# Patient Record
Sex: MALE | Race: BLACK OR AFRICAN AMERICAN | NOT HISPANIC OR LATINO | Employment: OTHER | URBAN - METROPOLITAN AREA
[De-identification: names, ages, dates, MRNs, and addresses within clinical notes are randomized per-mention and may not be internally consistent; named-entity substitution may affect disease eponyms.]

---

## 2023-05-18 ENCOUNTER — HOSPITAL ENCOUNTER (INPATIENT)
Facility: HOSPITAL | Age: 88
LOS: 1 days | Discharge: HOSPICE | DRG: 280 | End: 2023-05-20
Attending: STUDENT IN AN ORGANIZED HEALTH CARE EDUCATION/TRAINING PROGRAM | Admitting: INTERNAL MEDICINE
Payer: MEDICARE

## 2023-05-18 DIAGNOSIS — N18.6 ESRD (END STAGE RENAL DISEASE): ICD-10-CM

## 2023-05-18 DIAGNOSIS — R00.0 TACHYCARDIA: ICD-10-CM

## 2023-05-18 DIAGNOSIS — R07.9 CHEST PAIN: ICD-10-CM

## 2023-05-18 DIAGNOSIS — I50.9 CONGESTIVE HEART FAILURE, UNSPECIFIED HF CHRONICITY, UNSPECIFIED HEART FAILURE TYPE: ICD-10-CM

## 2023-05-18 DIAGNOSIS — I21.4 NSTEMI (NON-ST ELEVATED MYOCARDIAL INFARCTION): ICD-10-CM

## 2023-05-18 DIAGNOSIS — I50.41 ACUTE COMBINED SYSTOLIC AND DIASTOLIC HEART FAILURE: Primary | ICD-10-CM

## 2023-05-18 DIAGNOSIS — I50.9 ACUTE CONGESTIVE HEART FAILURE, UNSPECIFIED HEART FAILURE TYPE: ICD-10-CM

## 2023-05-18 LAB
ANION GAP SERPL CALC-SCNC: 21 MMOL/L (ref 8–16)
BASOPHILS # BLD AUTO: 0.02 X10(3)/MCL
BASOPHILS NFR BLD AUTO: 0.2 %
BUN SERPL-MCNC: 37 MG/DL (ref 6–30)
CHLORIDE SERPL-SCNC: 97 MMOL/L (ref 95–110)
CREAT SERPL-MCNC: 6.6 MG/DL (ref 0.5–1.4)
EOSINOPHIL # BLD AUTO: 0 X10(3)/MCL (ref 0–0.9)
EOSINOPHIL NFR BLD AUTO: 0 %
ERYTHROCYTE [DISTWIDTH] IN BLOOD BY AUTOMATED COUNT: 21.3 % (ref 11.5–17)
GLUCOSE SERPL-MCNC: 112 MG/DL (ref 70–110)
HCT VFR BLD AUTO: 41.1 % (ref 42–52)
HCT VFR BLD CALC: 43 %PCV (ref 36–54)
HGB BLD-MCNC: 12.6 G/DL (ref 14–18)
HGB BLD-MCNC: 15 G/DL
IMM GRANULOCYTES # BLD AUTO: 0.07 X10(3)/MCL (ref 0–0.04)
IMM GRANULOCYTES NFR BLD AUTO: 0.6 %
LYMPHOCYTES # BLD AUTO: 1.53 X10(3)/MCL (ref 0.6–4.6)
LYMPHOCYTES NFR BLD AUTO: 14 %
MCH RBC QN AUTO: 29 PG (ref 27–31)
MCHC RBC AUTO-ENTMCNC: 30.7 G/DL (ref 33–36)
MCV RBC AUTO: 94.5 FL (ref 80–94)
MONOCYTES # BLD AUTO: 0.96 X10(3)/MCL (ref 0.1–1.3)
MONOCYTES NFR BLD AUTO: 8.8 %
NEUTROPHILS # BLD AUTO: 8.33 X10(3)/MCL (ref 2.1–9.2)
NEUTROPHILS NFR BLD AUTO: 76.4 %
NRBC BLD AUTO-RTO: 1.3 %
PLATELET # BLD AUTO: 254 X10(3)/MCL (ref 130–400)
PMV BLD AUTO: 11.1 FL (ref 7.4–10.4)
POC IONIZED CALCIUM: 1.05 MMOL/L (ref 1.06–1.42)
POC TCO2 (MEASURED): 23 MMOL/L (ref 23–29)
POTASSIUM BLD-SCNC: 6.6 MMOL/L (ref 3.5–5.1)
RBC # BLD AUTO: 4.35 X10(6)/MCL (ref 4.7–6.1)
SAMPLE: ABNORMAL
SODIUM BLD-SCNC: 133 MMOL/L (ref 136–145)
TROPONIN I SERPL-MCNC: 0.83 NG/ML (ref 0–0.04)
TROPONIN I SERPL-MCNC: 1.07 NG/ML (ref 0–0.04)
WBC # SPEC AUTO: 10.91 X10(3)/MCL (ref 4.5–11.5)

## 2023-05-18 PROCEDURE — 93005 ELECTROCARDIOGRAM TRACING: CPT

## 2023-05-18 PROCEDURE — 82565 ASSAY OF CREATININE: CPT

## 2023-05-18 PROCEDURE — 96375 TX/PRO/DX INJ NEW DRUG ADDON: CPT

## 2023-05-18 PROCEDURE — 99291 CRITICAL CARE FIRST HOUR: CPT

## 2023-05-18 PROCEDURE — 80047 BASIC METABLC PNL IONIZED CA: CPT

## 2023-05-18 PROCEDURE — 25000003 PHARM REV CODE 250: Performed by: STUDENT IN AN ORGANIZED HEALTH CARE EDUCATION/TRAINING PROGRAM

## 2023-05-18 PROCEDURE — 96374 THER/PROPH/DIAG INJ IV PUSH: CPT

## 2023-05-18 PROCEDURE — 85025 COMPLETE CBC W/AUTO DIFF WBC: CPT | Performed by: NURSE PRACTITIONER

## 2023-05-18 PROCEDURE — 82962 GLUCOSE BLOOD TEST: CPT

## 2023-05-18 PROCEDURE — 96372 THER/PROPH/DIAG INJ SC/IM: CPT | Performed by: STUDENT IN AN ORGANIZED HEALTH CARE EDUCATION/TRAINING PROGRAM

## 2023-05-18 PROCEDURE — 63600175 PHARM REV CODE 636 W HCPCS: Performed by: STUDENT IN AN ORGANIZED HEALTH CARE EDUCATION/TRAINING PROGRAM

## 2023-05-18 PROCEDURE — 84484 ASSAY OF TROPONIN QUANT: CPT | Performed by: NURSE PRACTITIONER

## 2023-05-18 RX ORDER — ONDANSETRON 2 MG/ML
INJECTION INTRAMUSCULAR; INTRAVENOUS
Status: DISPENSED
Start: 2023-05-18 | End: 2023-05-19

## 2023-05-18 RX ORDER — CALCIUM GLUCONATE 20 MG/ML
1 INJECTION, SOLUTION INTRAVENOUS
Status: COMPLETED | OUTPATIENT
Start: 2023-05-18 | End: 2023-05-18

## 2023-05-18 RX ORDER — NOREPINEPHRINE BITARTRATE/D5W 8 MG/250ML
0-3 PLASTIC BAG, INJECTION (ML) INTRAVENOUS CONTINUOUS
Status: DISCONTINUED | OUTPATIENT
Start: 2023-05-19 | End: 2023-05-19

## 2023-05-18 RX ORDER — ENOXAPARIN SODIUM 100 MG/ML
1 INJECTION SUBCUTANEOUS
Status: COMPLETED | OUTPATIENT
Start: 2023-05-18 | End: 2023-05-18

## 2023-05-18 RX ORDER — ONDANSETRON 2 MG/ML
4 INJECTION INTRAMUSCULAR; INTRAVENOUS
Status: COMPLETED | OUTPATIENT
Start: 2023-05-18 | End: 2023-05-18

## 2023-05-18 RX ORDER — CALCIUM GLUCONATE 20 MG/ML
1 INJECTION, SOLUTION INTRAVENOUS EVERY 10 MIN PRN
Status: DISCONTINUED | OUTPATIENT
Start: 2023-05-18 | End: 2023-05-20 | Stop reason: HOSPADM

## 2023-05-18 RX ADMIN — ONDANSETRON 4 MG: 2 INJECTION INTRAMUSCULAR; INTRAVENOUS at 10:05

## 2023-05-18 RX ADMIN — SODIUM ZIRCONIUM CYCLOSILICATE 10 G: 10 POWDER, FOR SUSPENSION ORAL at 11:05

## 2023-05-18 RX ADMIN — CALCIUM GLUCONATE 1 G: 20 INJECTION, SOLUTION INTRAVENOUS at 11:05

## 2023-05-18 RX ADMIN — DEXTROSE MONOHYDRATE 250 ML: 100 INJECTION, SOLUTION INTRAVENOUS at 11:05

## 2023-05-18 RX ADMIN — INSULIN HUMAN 5 UNITS: 100 INJECTION, SOLUTION PARENTERAL at 11:05

## 2023-05-18 RX ADMIN — ENOXAPARIN SODIUM 80 MG: 80 INJECTION SUBCUTANEOUS at 11:05

## 2023-05-18 NOTE — FIRST PROVIDER EVALUATION
Medical screening examination initiated.  I have conducted a focused provider triage encounter, findings are as follows:    Brief history of present illness:    89-year-old male who is a dialysis patient (T/TH/Sat) and missed dialysis today due to chest pain and lower extremity swelling that started yesterday. Has 2 stents in place and history of Triple bypass. Currently on 2 L 02 all night and PRN during the day.   Dr. Gregory instructed patient and family to come to the hospital today due to acute symptoms.  Started dialysis in March of 2023.  Dialysis cath to to right chest wall.    Vitals:    05/18/23 1751   BP: 104/72   Pulse: 93   Resp: 20   Temp: 97.5 °F (36.4 °C)   TempSrc: Oral   SpO2: 97%       Pertinent physical exam:  Awake and alert    Brief workup plan:  Chest pain workup    Preliminary workup initiated; this workup will be continued and followed by the physician or advanced practice provider that is assigned to the patient when roomed.

## 2023-05-18 NOTE — Clinical Note
Diagnosis: NSTEMI (non-ST elevated myocardial infarction) [206927]   Admitting Provider:: JESSICA CABRERA [[id number]]   Future Attending Provider: JESSICA CABRERA [[id number]]   Reason for IP Medical Treatment  (Clinical interventions that can only be accomplished in the IP setting? ) :: ESRD On HD, Missed DIalysis thursday, NSTEMI   I certify that Inpatient services for greater than or equal to 2 midnights are medically necessary:: Yes   Plans for Post-Acute care--if anticipated (pick the single best option):: A. No post acute care anticipated at this time

## 2023-05-19 LAB
ALBUMIN SERPL-MCNC: 3.2 G/DL (ref 3.4–4.8)
ALBUMIN SERPL-MCNC: 3.2 G/DL (ref 3.4–4.8)
ALBUMIN/GLOB SERPL: 1 RATIO (ref 1.1–2)
ALBUMIN/GLOB SERPL: 1 RATIO (ref 1.1–2)
ALP SERPL-CCNC: 193 UNIT/L (ref 40–150)
ALP SERPL-CCNC: 194 UNIT/L (ref 40–150)
ALT SERPL-CCNC: 107 UNIT/L (ref 0–55)
ALT SERPL-CCNC: 119 UNIT/L (ref 0–55)
AST SERPL-CCNC: 114 UNIT/L (ref 5–34)
AST SERPL-CCNC: 94 UNIT/L (ref 5–34)
AV INDEX (PROSTH): 0.25
AV MEAN GRADIENT: 9 MMHG
AV PEAK GRADIENT: 12 MMHG
AV VALVE AREA: 0.78 CM2
AV VELOCITY RATIO: 0.24
BASOPHILS # BLD AUTO: 0.02 X10(3)/MCL
BASOPHILS NFR BLD AUTO: 0.2 %
BILIRUBIN DIRECT+TOT PNL SERPL-MCNC: 2 MG/DL
BILIRUBIN DIRECT+TOT PNL SERPL-MCNC: 2.2 MG/DL
BNP BLD-MCNC: ABNORMAL PG/ML
BSA FOR ECHO PROCEDURE: 2.01 M2
BUN SERPL-MCNC: 25.8 MG/DL (ref 8.4–25.7)
BUN SERPL-MCNC: 26.7 MG/DL (ref 8.4–25.7)
CALCIUM SERPL-MCNC: 9.7 MG/DL (ref 8.8–10)
CALCIUM SERPL-MCNC: 9.8 MG/DL (ref 8.8–10)
CHLORIDE SERPL-SCNC: 98 MMOL/L (ref 98–107)
CHLORIDE SERPL-SCNC: 99 MMOL/L (ref 98–107)
CO2 SERPL-SCNC: 16 MMOL/L (ref 23–31)
CO2 SERPL-SCNC: 19 MMOL/L (ref 23–31)
CREAT SERPL-MCNC: 6.26 MG/DL (ref 0.73–1.18)
CREAT SERPL-MCNC: 6.32 MG/DL (ref 0.73–1.18)
CV ECHO LV RWT: 0.36 CM
DOP CALC AO PEAK VEL: 1.74 M/S
DOP CALC AO VTI: 28.5 CM
DOP CALC LVOT AREA: 3.1 CM2
DOP CALC LVOT DIAMETER: 2 CM
DOP CALC LVOT PEAK VEL: 0.41 M/S
DOP CALC LVOT STROKE VOLUME: 22.29 CM3
DOP CALC MV VTI: 22.8 CM
DOP CALCLVOT PEAK VEL VTI: 7.1 CM
E WAVE DECELERATION TIME: 121 MSEC
E/A RATIO: 2.13
E/E' RATIO: 14.73 M/S
ECHO LV POSTERIOR WALL: 1.07 CM (ref 0.6–1.1)
EJECTION FRACTION: 15 %
EOSINOPHIL # BLD AUTO: 0 X10(3)/MCL (ref 0–0.9)
EOSINOPHIL NFR BLD AUTO: 0 %
ERYTHROCYTE [DISTWIDTH] IN BLOOD BY AUTOMATED COUNT: 21 % (ref 11.5–17)
FRACTIONAL SHORTENING: 11 % (ref 28–44)
GFR SERPLBLD CREATININE-BSD FMLA CKD-EPI: 8 MLS/MIN/1.73/M2
GFR SERPLBLD CREATININE-BSD FMLA CKD-EPI: 8 MLS/MIN/1.73/M2
GLOBULIN SER-MCNC: 3.1 GM/DL (ref 2.4–3.5)
GLOBULIN SER-MCNC: 3.3 GM/DL (ref 2.4–3.5)
GLUCOSE SERPL-MCNC: 125 MG/DL (ref 82–115)
GLUCOSE SERPL-MCNC: 99 MG/DL (ref 82–115)
HCT VFR BLD AUTO: 36.9 % (ref 42–52)
HGB BLD-MCNC: 11.4 G/DL (ref 14–18)
IMM GRANULOCYTES # BLD AUTO: 0.05 X10(3)/MCL (ref 0–0.04)
IMM GRANULOCYTES NFR BLD AUTO: 0.5 %
INTERVENTRICULAR SEPTUM: 1.16 CM (ref 0.6–1.1)
LACTATE SERPL-SCNC: 2.9 MMOL/L (ref 0.5–2.2)
LACTATE SERPL-SCNC: 7.2 MMOL/L (ref 0.5–2.2)
LEFT ATRIUM SIZE: 3.5 CM
LEFT INTERNAL DIMENSION IN SYSTOLE: 5.32 CM (ref 2.1–4)
LEFT VENTRICLE DIASTOLIC VOLUME INDEX: 89.5 ML/M2
LEFT VENTRICLE DIASTOLIC VOLUME: 179 ML
LEFT VENTRICLE MASS INDEX: 142 G/M2
LEFT VENTRICLE SYSTOLIC VOLUME INDEX: 68.5 ML/M2
LEFT VENTRICLE SYSTOLIC VOLUME: 137 ML
LEFT VENTRICULAR INTERNAL DIMENSION IN DIASTOLE: 5.99 CM (ref 3.5–6)
LEFT VENTRICULAR MASS: 283.89 G
LV LATERAL E/E' RATIO: 13.5 M/S
LV SEPTAL E/E' RATIO: 16.2 M/S
LVOT MG: 0 MMHG
LVOT MV: 0.26 CM/S
LYMPHOCYTES # BLD AUTO: 1.63 X10(3)/MCL (ref 0.6–4.6)
LYMPHOCYTES NFR BLD AUTO: 14.7 %
MAGNESIUM SERPL-MCNC: 2.7 MG/DL (ref 1.6–2.6)
MAGNESIUM SERPL-MCNC: 2.8 MG/DL (ref 1.6–2.6)
MCH RBC QN AUTO: 28.6 PG (ref 27–31)
MCHC RBC AUTO-ENTMCNC: 30.9 G/DL (ref 33–36)
MCV RBC AUTO: 92.5 FL (ref 80–94)
MONOCYTES # BLD AUTO: 1.36 X10(3)/MCL (ref 0.1–1.3)
MONOCYTES NFR BLD AUTO: 12.3 %
MV MEAN GRADIENT: 2 MMHG
MV PEAK A VEL: 0.38 M/S
MV PEAK E VEL: 0.81 M/S
MV PEAK GRADIENT: 4 MMHG
MV STENOSIS PRESSURE HALF TIME: 53 MS
MV VALVE AREA BY CONTINUITY EQUATION: 0.98 CM2
MV VALVE AREA P 1/2 METHOD: 4.15 CM2
NEUTROPHILS # BLD AUTO: 8.01 X10(3)/MCL (ref 2.1–9.2)
NEUTROPHILS NFR BLD AUTO: 72.3 %
NRBC BLD AUTO-RTO: 1.7 %
PHOSPHATE SERPL-MCNC: 5.6 MG/DL (ref 2.3–4.7)
PHOSPHATE SERPL-MCNC: 5.8 MG/DL (ref 2.3–4.7)
PISA TR MAX VEL: 2.81 M/S
PLATELET # BLD AUTO: 209 X10(3)/MCL (ref 130–400)
PMV BLD AUTO: 11.1 FL (ref 7.4–10.4)
POCT GLUCOSE: 106 MG/DL (ref 70–110)
POTASSIUM SERPL-SCNC: 4.6 MMOL/L (ref 3.5–5.1)
POTASSIUM SERPL-SCNC: 4.8 MMOL/L (ref 3.5–5.1)
PROT SERPL-MCNC: 6.3 GM/DL (ref 5.8–7.6)
PROT SERPL-MCNC: 6.5 GM/DL (ref 5.8–7.6)
PV PEAK VELOCITY: 0.86 CM/S
RA PRESSURE: 8 MMHG
RA WIDTH: 4.36 CM
RBC # BLD AUTO: 3.99 X10(6)/MCL (ref 4.7–6.1)
SODIUM SERPL-SCNC: 136 MMOL/L (ref 136–145)
SODIUM SERPL-SCNC: 138 MMOL/L (ref 136–145)
TDI LATERAL: 0.06 M/S
TDI SEPTAL: 0.05 M/S
TDI: 0.06 M/S
TR MAX PG: 32 MMHG
TRICUSPID ANNULAR PLANE SYSTOLIC EXCURSION: 0.96 CM
TV REST PULMONARY ARTERY PRESSURE: 40 MMHG
WBC # SPEC AUTO: 11.07 X10(3)/MCL (ref 4.5–11.5)

## 2023-05-19 PROCEDURE — 25000003 PHARM REV CODE 250: Performed by: NURSE PRACTITIONER

## 2023-05-19 PROCEDURE — 93005 ELECTROCARDIOGRAM TRACING: CPT

## 2023-05-19 PROCEDURE — 25000242 PHARM REV CODE 250 ALT 637 W/ HCPCS: Performed by: NURSE PRACTITIONER

## 2023-05-19 PROCEDURE — 25000003 PHARM REV CODE 250: Performed by: INTERNAL MEDICINE

## 2023-05-19 PROCEDURE — 63600175 PHARM REV CODE 636 W HCPCS: Performed by: NURSE PRACTITIONER

## 2023-05-19 PROCEDURE — 83735 ASSAY OF MAGNESIUM: CPT | Performed by: NURSE PRACTITIONER

## 2023-05-19 PROCEDURE — 84100 ASSAY OF PHOSPHORUS: CPT | Performed by: NURSE PRACTITIONER

## 2023-05-19 PROCEDURE — 83605 ASSAY OF LACTIC ACID: CPT | Performed by: INTERNAL MEDICINE

## 2023-05-19 PROCEDURE — 25500020 PHARM REV CODE 255: Performed by: NURSE PRACTITIONER

## 2023-05-19 PROCEDURE — 63600175 PHARM REV CODE 636 W HCPCS: Performed by: INTERNAL MEDICINE

## 2023-05-19 PROCEDURE — 90935 HEMODIALYSIS ONE EVALUATION: CPT

## 2023-05-19 PROCEDURE — 80053 COMPREHEN METABOLIC PANEL: CPT | Performed by: NURSE PRACTITIONER

## 2023-05-19 PROCEDURE — 11000001 HC ACUTE MED/SURG PRIVATE ROOM

## 2023-05-19 PROCEDURE — 83880 ASSAY OF NATRIURETIC PEPTIDE: CPT | Performed by: NURSE PRACTITIONER

## 2023-05-19 PROCEDURE — 85025 COMPLETE CBC W/AUTO DIFF WBC: CPT | Performed by: NURSE PRACTITIONER

## 2023-05-19 PROCEDURE — 83605 ASSAY OF LACTIC ACID: CPT | Performed by: STUDENT IN AN ORGANIZED HEALTH CARE EDUCATION/TRAINING PROGRAM

## 2023-05-19 PROCEDURE — 25000003 PHARM REV CODE 250: Performed by: STUDENT IN AN ORGANIZED HEALTH CARE EDUCATION/TRAINING PROGRAM

## 2023-05-19 PROCEDURE — 87040 BLOOD CULTURE FOR BACTERIA: CPT | Performed by: STUDENT IN AN ORGANIZED HEALTH CARE EDUCATION/TRAINING PROGRAM

## 2023-05-19 RX ORDER — NITROGLYCERIN 0.4 MG/1
0.4 TABLET SUBLINGUAL EVERY 5 MIN PRN
Status: DISCONTINUED | OUTPATIENT
Start: 2023-05-19 | End: 2023-05-20 | Stop reason: HOSPADM

## 2023-05-19 RX ORDER — DOCUSATE SODIUM 100 MG/1
100 CAPSULE, LIQUID FILLED ORAL 2 TIMES DAILY
COMMUNITY

## 2023-05-19 RX ORDER — HYDRALAZINE HYDROCHLORIDE 25 MG/1
1 TABLET, FILM COATED ORAL 3 TIMES DAILY
Status: ON HOLD | COMMUNITY
Start: 2023-04-27 | End: 2023-05-20 | Stop reason: HOSPADM

## 2023-05-19 RX ORDER — TRAMADOL HYDROCHLORIDE 50 MG/1
50 TABLET ORAL EVERY 8 HOURS PRN
COMMUNITY

## 2023-05-19 RX ORDER — DIPHENHYDRAMINE HYDROCHLORIDE 50 MG/ML
50 INJECTION INTRAMUSCULAR; INTRAVENOUS EVERY 6 HOURS PRN
Status: DISCONTINUED | OUTPATIENT
Start: 2023-05-19 | End: 2023-05-20 | Stop reason: HOSPADM

## 2023-05-19 RX ORDER — FUROSEMIDE 10 MG/ML
40 INJECTION INTRAMUSCULAR; INTRAVENOUS
Status: DISCONTINUED | OUTPATIENT
Start: 2023-05-19 | End: 2023-05-20 | Stop reason: HOSPADM

## 2023-05-19 RX ORDER — AMLODIPINE BESYLATE 5 MG/1
5 TABLET ORAL 2 TIMES DAILY
COMMUNITY

## 2023-05-19 RX ORDER — ONDANSETRON 2 MG/ML
4 INJECTION INTRAMUSCULAR; INTRAVENOUS EVERY 6 HOURS PRN
Status: DISCONTINUED | OUTPATIENT
Start: 2023-05-19 | End: 2023-05-20 | Stop reason: HOSPADM

## 2023-05-19 RX ORDER — NAPROXEN SODIUM 220 MG/1
81 TABLET, FILM COATED ORAL DAILY
Status: DISCONTINUED | OUTPATIENT
Start: 2023-05-19 | End: 2023-05-20 | Stop reason: HOSPADM

## 2023-05-19 RX ORDER — PANTOPRAZOLE SODIUM 40 MG/1
1 TABLET, DELAYED RELEASE ORAL 2 TIMES DAILY
COMMUNITY
Start: 2023-03-22

## 2023-05-19 RX ORDER — MUPIROCIN 20 MG/G
OINTMENT TOPICAL 2 TIMES DAILY
Status: DISCONTINUED | OUTPATIENT
Start: 2023-05-19 | End: 2023-05-20 | Stop reason: HOSPADM

## 2023-05-19 RX ORDER — CLOPIDOGREL BISULFATE 75 MG/1
75 TABLET ORAL DAILY
COMMUNITY

## 2023-05-19 RX ORDER — PANTOPRAZOLE SODIUM 40 MG/1
40 TABLET, DELAYED RELEASE ORAL DAILY
Status: DISCONTINUED | OUTPATIENT
Start: 2023-05-19 | End: 2023-05-20 | Stop reason: HOSPADM

## 2023-05-19 RX ORDER — SODIUM BICARBONATE 1 MEQ/ML
50 SYRINGE (ML) INTRAVENOUS
Status: COMPLETED | OUTPATIENT
Start: 2023-05-19 | End: 2023-05-19

## 2023-05-19 RX ORDER — NAPROXEN SODIUM 220 MG/1
81 TABLET, FILM COATED ORAL DAILY
COMMUNITY

## 2023-05-19 RX ORDER — NAPROXEN SODIUM 220 MG/1
1 TABLET, FILM COATED ORAL DAILY
Status: ON HOLD | COMMUNITY
End: 2023-05-20 | Stop reason: HOSPADM

## 2023-05-19 RX ADMIN — DIPHENHYDRAMINE HYDROCHLORIDE 50 MG: 50 INJECTION INTRAMUSCULAR; INTRAVENOUS at 05:05

## 2023-05-19 RX ADMIN — IOPAMIDOL 100 ML: 755 INJECTION, SOLUTION INTRAVENOUS at 05:05

## 2023-05-19 RX ADMIN — ASPIRIN 81 MG 81 MG: 81 TABLET ORAL at 02:05

## 2023-05-19 RX ADMIN — ONDANSETRON 4 MG: 2 INJECTION INTRAMUSCULAR; INTRAVENOUS at 10:05

## 2023-05-19 RX ADMIN — PIPERACILLIN AND TAZOBACTAM 4.5 G: 4; .5 INJECTION, POWDER, LYOPHILIZED, FOR SOLUTION INTRAVENOUS; PARENTERAL at 10:05

## 2023-05-19 RX ADMIN — SODIUM BICARBONATE 50 MEQ: 84 INJECTION, SOLUTION INTRAVENOUS at 12:05

## 2023-05-19 RX ADMIN — MUPIROCIN: 20 OINTMENT TOPICAL at 10:05

## 2023-05-19 RX ADMIN — VANCOMYCIN HYDROCHLORIDE 1750 MG: 500 INJECTION, POWDER, LYOPHILIZED, FOR SOLUTION INTRAVENOUS at 05:05

## 2023-05-19 RX ADMIN — NITROGLYCERIN 0.4 MG: 0.4 TABLET, ORALLY DISINTEGRATING SUBLINGUAL at 04:05

## 2023-05-19 RX ADMIN — PIPERACILLIN AND TAZOBACTAM 4.5 G: 4; .5 INJECTION, POWDER, LYOPHILIZED, FOR SOLUTION INTRAVENOUS; PARENTERAL at 04:05

## 2023-05-19 RX ADMIN — FUROSEMIDE 40 MG: 10 INJECTION, SOLUTION INTRAMUSCULAR; INTRAVENOUS at 10:05

## 2023-05-19 RX ADMIN — DIPHENHYDRAMINE HYDROCHLORIDE 50 MG: 50 INJECTION INTRAMUSCULAR; INTRAVENOUS at 12:05

## 2023-05-19 NOTE — ED PROVIDER NOTES
"Encounter Date: 5/18/2023    SCRIBE #1 NOTE: I, Jessie Vi, am scribing for, and in the presence of,  Sudheer Bloom MD. I have scribed the following portions of the note - Other sections scribed: HPI, ROS, PE, EKG.     History     Chief Complaint   Patient presents with    Chest Pain     Pt to ER via POV for chest pain.  Also n/v and swelling.  Started yesterday.  Pt missed dialysis today (TTS) due to feeling unwell.  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 Pt describes pain as "sticking" and has been ongoing for 3 months. Wife at bedside stated pt missed dialysis appointment with Dr. Gregory today and recommended they bring him to the ER for evaluation. Wife stated pt's heart rate fluctuates rapidly throughout the day. She reports pt has swollen feet and denies vomiting. Notes pt is on O2 at home.      The history is provided by the patient. The history is limited by the condition of the patient. No  was used.   Chest Pain  The current episode started yesterday. Chest pain occurs constantly. The chest pain is worsening. At its most intense, the chest pain is at 5/10. The quality of the pain is described as aching. The pain does not radiate. Pertinent negatives for primary symptoms include no fever, no abdominal pain and no vomiting.   Associated symptoms include lower extremity edema. Risk factors include male gender.   His past medical history is significant for CHF. Past medical history comments: ESRD   Procedure history is positive for cardiac catheterization.   Review of patient's allergies indicates:   Allergen Reactions    Pembrolizumab Shortness Of Breath    Iodine Other (See Comments)     Other reaction(s): Not Indicated, Unknown    Sulfa (sulfonamide antibiotics)      Other reaction(s): Unknown     History reviewed. No pertinent past medical " history.  History reviewed. No pertinent surgical history.  History reviewed. No pertinent family history.     Review of Systems   Constitutional:  Negative for fever.   HENT:  Negative for sore throat.    Eyes:  Negative for visual disturbance.   Cardiovascular:  Positive for chest pain and leg swelling.   Gastrointestinal:  Negative for abdominal pain and vomiting.   Genitourinary:  Negative for dysuria.   Skin:  Negative for rash.   Psychiatric/Behavioral:  Negative for confusion.    All other systems reviewed and are negative.    Physical Exam     Initial Vitals [05/18/23 1751]   BP Pulse Resp Temp SpO2   104/72 93 20 97.5 °F (36.4 °C) 97 %      MAP       --         Physical Exam    Nursing note and vitals reviewed.  Constitutional: He appears well-developed and well-nourished. He is not diaphoretic. He does not appear ill. No distress.   HENT:   Head: Normocephalic and atraumatic.   Eyes: Conjunctivae and EOM are normal. Pupils are equal, round, and reactive to light.   Neck: Neck supple.   Normal range of motion.  Cardiovascular:  Regular rhythm, normal heart sounds and intact distal pulses.   Tachycardia present.         No murmur heard.  Pulmonary/Chest: He is in respiratory distress. He has no wheezes. He has no rales.   Vas Cath to right chest wall.  Crackles at bases   Abdominal: Abdomen is soft. Bowel sounds are normal. He exhibits no distension. There is no abdominal tenderness. There is no rebound.   Musculoskeletal:         General: Edema present. No tenderness. Normal range of motion.      Cervical back: Normal range of motion and neck supple.      Lumbar back: Normal. No tenderness. Normal range of motion.      Comments: 2+ pitting edema to lower extremities.     Neurological: He is alert and oriented to person, place, and time. He has normal strength. No cranial nerve deficit or sensory deficit.   Skin: Skin is warm and dry. Capillary refill takes less than 2 seconds. No rash noted. No erythema.    Psychiatric: He has a normal mood and affect. His mood appears not anxious.       ED Course   Critical Care    Date/Time: 5/18/2023 9:40 PM  Performed by: Sudheer Bloom MD  Authorized by: Sudheer Bloom MD   Direct patient critical care time: 10 minutes  Additional history critical care time: 7 minutes  Ordering / reviewing critical care time: 5 minutes  Documentation critical care time: 5 minutes  Consulting other physicians critical care time: 5 minutes  Consult with family critical care time: 2 minutes  Total critical care time (exclusive of procedural time) : 34 minutes  Critical care time was exclusive of separately billable procedures and treating other patients and teaching time.  Critical care was necessary to treat or prevent imminent or life-threatening deterioration of the following conditions: cardiac failure, circulatory failure, renal failure, metabolic crisis and respiratory failure.  Critical care was time spent personally by me on the following activities: development of treatment plan with patient or surrogate, discussions with consultants, interpretation of cardiac output measurements, evaluation of patient's response to treatment, examination of patient, obtaining history from patient or surrogate, ordering and performing treatments and interventions, ordering and review of laboratory studies, ordering and review of radiographic studies, pulse oximetry, re-evaluation of patient's condition and review of old charts.      Labs Reviewed   COMPREHENSIVE METABOLIC PANEL - Abnormal; Notable for the following components:       Result Value    Carbon Dioxide 16 (*)     Glucose Level 125 (*)     Blood Urea Nitrogen 25.8 (*)     Creatinine 6.26 (*)     Albumin Level 3.2 (*)     Albumin/Globulin Ratio 1.0 (*)     Bilirubin Total 2.2 (*)     Alkaline Phosphatase 193 (*)     Alanine Aminotransferase 107 (*)     Aspartate Aminotransferase 94 (*)     All other components within normal limits   TROPONIN I -  Abnormal; Notable for the following components:    Troponin-I 0.833 (*)     All other components within normal limits   TROPONIN I - Abnormal; Notable for the following components:    Troponin-I 1.069 (*)     All other components within normal limits   B-TYPE NATRIURETIC PEPTIDE - Abnormal; Notable for the following components:    Natriuretic Peptide 18,927.3 (*)     All other components within normal limits   CBC WITH DIFFERENTIAL - Abnormal; Notable for the following components:    RBC 4.35 (*)     Hgb 12.6 (*)     Hct 41.1 (*)     MCV 94.5 (*)     MCHC 30.7 (*)     RDW 21.3 (*)     MPV 11.1 (*)     IG# 0.07 (*)     All other components within normal limits   MAGNESIUM - Abnormal; Notable for the following components:    Magnesium Level 2.80 (*)     All other components within normal limits   PHOSPHORUS - Abnormal; Notable for the following components:    Phosphorus Level 5.8 (*)     All other components within normal limits   LACTIC ACID, PLASMA - Abnormal; Notable for the following components:    Lactic Acid Level 7.2 (*)     All other components within normal limits   COMPREHENSIVE METABOLIC PANEL - Abnormal; Notable for the following components:    Carbon Dioxide 19 (*)     Blood Urea Nitrogen 26.7 (*)     Creatinine 6.32 (*)     Albumin Level 3.2 (*)     Albumin/Globulin Ratio 1.0 (*)     Bilirubin Total 2.0 (*)     Alkaline Phosphatase 194 (*)     Alanine Aminotransferase 119 (*)     Aspartate Aminotransferase 114 (*)     All other components within normal limits   MAGNESIUM - Abnormal; Notable for the following components:    Magnesium Level 2.70 (*)     All other components within normal limits   PHOSPHORUS - Abnormal; Notable for the following components:    Phosphorus Level 5.6 (*)     All other components within normal limits   CBC WITH DIFFERENTIAL - Abnormal; Notable for the following components:    RBC 3.99 (*)     Hgb 11.4 (*)     Hct 36.9 (*)     MCHC 30.9 (*)     RDW 21.0 (*)     MPV 11.1 (*)      Mono # 1.36 (*)     IG# 0.05 (*)     All other components within normal limits   LACTIC ACID, PLASMA - Abnormal; Notable for the following components:    Lactic Acid Level 2.9 (*)     All other components within normal limits   ISTAT CHEM8 - Abnormal; Notable for the following components:    POC Glucose 112 (*)     POC BUN 37 (*)     POC Creatinine 6.6 (*)     POC Sodium 133 (*)     POC Potassium 6.6 (*)     POC Anion Gap 21 (*)     POC Ionized Calcium 1.05 (*)     All other components within normal limits   CBC W/ AUTO DIFFERENTIAL    Narrative:     The following orders were created for panel order CBC auto differential.  Procedure                               Abnormality         Status                     ---------                               -----------         ------                     CBC with Differential[[id number]]        Abnormal            Final result                 Please view results for these tests on the individual orders.   CBC W/ AUTO DIFFERENTIAL    Narrative:     The following orders were created for panel order CBC Auto Differential.  Procedure                               Abnormality         Status                     ---------                               -----------         ------                     CBC with Differential[[id number]]        Abnormal            Final result                 Please view results for these tests on the individual orders.   POCT GLUCOSE     EKG Readings: (Independently Interpreted)   Initial Reading: No STEMI. Rhythm: Normal Sinus Rhythm. Heart Rate: 95. Ectopy: PVCs. Conduction: Normal. ST Segments: Normal ST Segments. T Waves: Normal. Axis: Normal. Clinical Impression: Normal Sinus Rhythm with LBBB   Performed at 1753 on 05/18/2023.   ECG Results              EKG 12-lead (Final result)  Result time 05/19/23 19:23:06      Final result by Interface, Lab In [facility] (05/19/23 19:23:06)                   Narrative:    Test Reason : R07.9,    Vent. Rate : 087 BPM      Atrial Rate : 087 BPM     P-R Int : 172 ms          QRS Dur : 136 ms      QT Int : 440 ms       P-R-T Axes : 017 -10 194 degrees     QTc Int : 529 ms    Sinus rhythm with occasional Premature ventricular complexes  Left bundle branch block  Abnormal ECG  Normal sinus rhythm has replaced SVT    Confirmed by Leeroy Mccloud MD ([id number]) on 5/19/2023 7:22:58 PM    Referred By: [email or web address]   SELF           Confirmed By:Leeroy Mccloud MD                                     EKG 12-lead (Final result)  Result time 05/19/23 08:31:52      Final result by Interface, Lab In [facility] (05/19/23 08:31:52)                   Narrative:    Test Reason : R00.0,    Vent. Rate : 128 BPM     Atrial Rate : 000 BPM     P-R Int : 000 ms          QRS Dur : 144 ms      QT Int : 398 ms       P-R-T Axes : 000 -03 194 degrees     QTc Int : 581 ms    SVT  Left bundle branch block  Abnormal ECG  When compared with ECG of 18-MAY-2023 17:53,  SVT  has replaced Sinus rhythm  Confirmed by Leeroy Mccloud MD ([id number]) on 5/19/2023 8:31:46 AM    Referred By: [email or web address]   SELF           Confirmed By:Leeroy Mccloud MD                                     EKG 12-lead (Final result)  Result time 05/19/23 08:25:37      Final result by Interface, Lab In [facility] (05/19/23 08:25:37)                   Narrative:    Test Reason : R07.9,    Vent. Rate : 095 BPM     Atrial Rate : 095 BPM     P-R Int : 204 ms          QRS Dur : 122 ms      QT Int : 424 ms       P-R-T Axes : 026 012 204 degrees     QTc Int : 532 ms    Sinus rhythm with occasional Premature ventricular complexes and Fusion  complexes  Left bundle branch block  Abnormal ECG  No previous ECGs available  Confirmed by Leeroy Mccloud MD ([id number]) on 5/19/2023 8:25:28 AM    Referred By:             Confirmed By:Leeroy Mccloud MD                                     EKG 12-LEAD (Final result)  Result time 05/23/23 15:39:25      Final result by Unknown User (05/23/23 15:39:25)                                       Imaging Results              CT Chest Abdomen Pelvis With Contrast (xpd) (Final result)  Result time 05/19/23 08:54:28      Final result by Maged Amaya MD (05/19/23 08:54:28)                   Impression:    Impression:    1. No filling defects are seen in the pulmonary arteries to suggest pulmonary embolus to the sensitivity of the study.    2. Moderate free fluid is seen in the pelvis.    3. The bladder is nondistended however the bladder wall appears thickened after considering state of nondistension which could reflect an element of cystitis.    4. There are moderate bilateral pleural effusions associated with lower lobe collapse and dependent groundglass densities in posterior segments of upper lobes. Otherwise the lungs are clear with no focal infiltrates or consolidation.    5. There is mild edematous thickening of the cecum and the ascending colon. Correlate with clinical and laboratory findings with regards to colitis.    6. Details and other findings as discussed above.    No significant discrepancy with overnight report.      Electronically signed by: Maged Amaya  Date:    05/19/2023  Time:    08:54               Narrative:      TECHNIQUE:CT SCAN OF THE CHEST ABDOMEN AND PELVIS WAS PERFORMED WITH INTRAVENOUS CONTRAST WITH AXIAL AS WELL AS SAGITTAL AND, CORONAL IMAGES.    DOSAGE INFORMATION:AUTOMATED EXPOSURE CONTROL WAS UTILIZED.  DLP 1320    COMPARISON:NONE.    CLINICAL HISTORY:SEPSIS.    Findings:    Lines and Tubes:Right internal jugular, catheter is present with its tip in the superior vena cave.    Mediastinum:A few subcentimeter mediastinal lymph nodes are seen paratracheal and precarinal and subcarinal.    Heart: Coronary artery calcification is seen. The patient is status post median sternotomy.    Aorta:Unremarkable appearing aorta. Mild aortic calcification is seen in the arch thoracic aorta.    Pulmonary Arteries:No filling defects are seen in the pulmonary arteries to suggest  pulmonary embolus to the sensitivity of the study.    Lungs:There are moderate bilateral pleural effusions associated with lower lobe collapse and dependent groundglass densities in posterior segments of upper lobes. Otherwise the lungs are clear with no focal infiltrates or consolidation.    Pleura:There is a large right sided pleural effusion. There is a large left sided pleural effusion.    Bony Structures:    Spine:Moderate spondylolytic changes are seen in the thoracic spine.    Ribs:No rib fractures are identified.    Abdomen:    Abdominal Wall:There is extensive stranding of the subcutaneous fat suggesting an element of anasarca edema of uncertain etiology. The abdominal wall otherwise appears unremarkable.    Liver: There is right hepatic lobe 9 mm hypodensity on image 56 series 2 which is not adequately characterized and may be a cyst.  This can be further assessed with ultrasound exam.    Biliary System:No intrahepatic or extrahepatic biliary duct dilatation is seen.    Gallbladder:The gallbladder is non-distended and appears otherwise unremarkable.    Pancreas:The pancreas appears unremarkable.    Spleen:The spleen appears unremarkable.    Adrenals:The adrenal glands appear unremarkable.    Kidneys:Multiple cortical hypodensities in bilateral kidneys are noted.  These are not adequately assessed due to limited contrast opacification of the kidneys and may be cysts.  Please further assess with ultrasound exam.  The kidneys are without stones or hydronephrosis.    Aorta:The abdominal aorta appears unremarkable.    Bowel:    Esophagus:The visualized esophagus appears unremarkable.    Stomach:The stomach appears unremarkable.    Duodenum:Unremarkable appearing duodenum.    Small Bowel:The small bowel appears unremarkable.    Colon:Nondistended. There is mild edematous thickening of the cecum and the ascending colon.    Appendix:The appendix is not identified but no inflammatory changes are seen in the right  lower quadrant to suggest appendicitis.    Peritoneum:No free intraperitoneal air is seen. Moderate free fluid is seen in the pelvis.    Pelvis:    Bladder:The bladder is nondistended however the bladder wall appears thickened after considering state of nondistension which could reflect an element of cystitis. There is air in the lumen of urinary bladder. Correlate clinically with regards to recent catheterisation.    Male:    Prostate gland:There are multiple punctate hyperdensities in the prostate gland. These are consistent with brachytherapy beads.    Bony structures:    Dorsal Spine:There is moderate multilevel spondylosis of the visualized dorsal spine.    Bony Pelvis:The visualized bony structures of the pelvis appear unremarkable.                        Preliminary result by Maged Amaya MD (05/19/23 05:47:07)                   Narrative:    START OF REPORT:  TECHNIQUE: CT SCAN OF THE CHEST ABDOMEN AND PELVIS WAS PERFORMED WITH INTRAVENOUS CONTRAST WITH AXIAL AS WELL AS SAGITTAL AND, CORONAL IMAGES.    DOSAGE INFORMATION: AUTOMATED EXPOSURE CONTROL WAS UTILIZED.    COMPARISON: NONE.    CLINICAL HISTORY: SEPSIS.    Findings:  Soft Tissues: Unremarkable.  Lines and Tubes: Right internal jugular, catheter is present with its tip in the superior vena cave.  Neck: The visualized soft tissues of the neck appear unremarkable. The thyroid gland appear unremarkable.  Mediastinum: A few subcentimeter mediastinal lymph nodes are seen paratracheal and precarinal and subcarinal . This suggests reactive lymphadenopathy.  Heart: Mild cardiomegaly is seen. Coronary artery calcification is seen. The patient is status post median sternotomy.  Aorta: Unremarkable appearing aorta. Mild aortic calcification is seen in the arch thoracic aorta.  Pulmonary Arteries: No filling defects are seen in the pulmonary arteries to suggest pulmonary embolus to the sensitivity of the study.  Lungs: There are moderate bilateral pleural  effusions associated with lower lobe collapse and dependent groundglass densities in posterior segments of upper lobes. Otherwise the lungs are clear with no focal infiltrates or consolidation.  Pleura: There is a large right sided pleural effusion. There is a large left sided pleural effusion.  Bony Structures:  Spine: Moderate spondylolytic changes are seen in the thoracic spine.  Ribs: No rib fractures are identified.  Abdomen:  Abdominal Wall: There is extensive stranding of the subcutaneous fat suggesting an element of anasarca edema of uncertain etiology. The abdominal wall otherwise appears unremarkable.  Liver: The liver otherwise appears unremarkable.  Biliary System: No intrahepatic or extrahepatic biliary duct dilatation is seen.  Gallbladder: The gallbladder is non-distended and appears otherwise unremarkable.  Pancreas: The pancreas appears unremarkable.  Spleen: The spleen appears unremarkable.  Adrenals: The adrenal glands appear unremarkable.  Kidneys: Multiple simple cortical cysts in bilateral kidneys are noted. The kidneys otherwise appear unremarkable with no stones or masses or hydronephrosis.  Aorta: The abdominal aorta appears unremarkable.  IVC: Unremarkable.  Bowel:  Esophagus: The visualized esophagus appears unremarkable.  Stomach: The stomach appears unremarkable.  Duodenum: Unremarkable appearing duodenum.  Small Bowel: The small bowel appears unremarkable.  Colon: Nondistended. There is mild edematous thickening of the cecum and the ascending colon.  Appendix: The appendix is not identified but no inflammatory changes are seen in the right lower quadrant to suggest appendicitis.  Peritoneum: No free intraperitoneal air is seen. Moderate free fluid is seen in the pelvis.    Pelvis:  Bladder: The bladder is nondistended however the bladder wall appears thickened after considering state of nondistension which could reflect an element of cystitis. There is air in the lumen of urinary  bladder. Correlate clinically with regards to recent catheterisation.  Male:  Prostate gland: There are multiple punctate hyperdensities in the prostate gland. These are consistent with brachytherapy beads.    Bony structures:  Dorsal Spine: There is moderate multilevel spondylosis of the visualized dorsal spine.  Bony Pelvis: The visualized bony structures of the pelvis appear unremarkable.      Impression:  1. No filling defects are seen in the pulmonary arteries to suggest pulmonary embolus to the sensitivity of the study.  2. Moderate free fluid is seen in the pelvis.  3. The bladder is nondistended however the bladder wall appears thickened after considering state of nondistension which could reflect an element of cystitis.  4. There are moderate bilateral pleural effusions associated with lower lobe collapse and dependent groundglass densities in posterior segments of upper lobes. Otherwise the lungs are clear with no focal infiltrates or consolidation.  5. There is mild edematous thickening of the cecum and the ascending colon. Correlate with clinical and laboratory findings with regards to colitis.  6. Details and other findings as discussed above.                                         X-Ray Chest PA And Lateral (Final result)  Result time 05/18/23 19:43:39      Final result by Olena Campos MD (05/18/23 19:43:39)                   Impression:      Enlarged cardiac silhouette with mild vascular congestion and probable trace effusions.      Electronically signed by: Olena Campos  Date:    05/18/2023  Time:    19:43               Narrative:    EXAMINATION:  XR CHEST PA AND LATERAL    CLINICAL HISTORY:  Chest Pain;    TECHNIQUE:  Two views of the chest    COMPARISON:  04/22/2012    FINDINGS:  LINES AND TUBES: Right IJ CVC tip projects over the right atrium.    MEDIASTINUM AND JESSICA: Cardiac silhouette is enlarged. Aortic atherosclerosis.    LUNGS: Mild vascular congestion.    PLEURA:Probable trace  pleural effusions.  Lateral view limited due to imaging of patient in a chair which obscures the posterior costophrenic sulci.No pneumothorax.    BONES: Postop sternotomy.                                    X-Rays:   Independently Interpreted Readings:   Chest X-Ray: Increased vascular markings consistent with CHF are present.   Medications   dextrose 10% bolus 500 mL 500 mL (0 mLs Intravenous Stopped 5/18/23 2342)     And   dextrose 10% bolus 250 mL 250 mL (has no administration in time range)     And   insulin regular injection 5 Units 0.05 mL (5 Units Intravenous Given 5/18/23 2313)   ondansetron injection 4 mg (4 mg Intravenous Given 5/18/23 2217)   enoxaparin injection 80 mg (80 mg Subcutaneous Given 5/18/23 2316)   sodium zirconium cyclosilicate packet 10 g (10 g Oral Given 5/18/23 2317)   calcium gluconate 1 g in NS IVPB (premixed) (0 g Intravenous Stopped 5/18/23 2326)   sodium bicarbonate 8.4 % (1 mEq/mL) injection 50 mEq (50 mEq Intravenous Given 5/19/23 0050)   vancomycin (VANCOCIN) 1,750 mg in dextrose 5 % (D5W) 500 mL IVPB (0 mg Intravenous Stopped 5/19/23 0741)   iopamidoL (ISOVUE-370) injection 100 mL (100 mLs Intravenous Given 5/19/23 0553)     Medical Decision Making  Problems Addressed:  Acute congestive heart failure, unspecified heart failure type: acute illness or injury that poses a threat to life or bodily functions  Chest pain: acute illness or injury that poses a threat to life or bodily functions  Congestive heart failure, unspecified HF chronicity, unspecified heart failure type: acute illness or injury that poses a threat to life or bodily functions  ESRD (end stage renal disease): acute illness or injury that poses a threat to life or bodily functions  NSTEMI (non-ST elevated myocardial infarction): acute illness or injury that poses a threat to life or bodily functions    Amount and/or Complexity of Data Reviewed  Independent Historian: spouse     Details: Pt missed dialysis appointment  with Dr. Gregory today and recommended they bring him to the ER for evaluation. Stated pt's heart rate fluctuates rapidly throughout the day. Reports pt has swollen feet and denies vomiting. Notes pt is on O2 at home.  Labs: ordered.  Radiology: ordered and independent interpretation performed.  ECG/medicine tests: ordered and independent interpretation performed.    Risk  Parenteral controlled substances.  Decision regarding hospitalization.      Medical Decision Making:   History:   I obtained history from: someone other than patient and EMS provider.       <> Summary of History: Collateral history obtained from family.  History of dialysis ESRD on Tuesday Thursday Saturday.  Old Medical Records: I decided to obtain old medical records.  Old Records Summarized: records from clinic visits and records from previous admission(s).       <> Summary of Records: Review previous records, history of ESRD, congestive heart failure, has had previous heart catheterization.  Initial Assessment:   Shortness of breath likely volume overload from missing dialysis.  Differential Diagnosis:   Judging by the patient's chief complaint and pertinent history, the patient has the following possible differential diagnoses, including but not limited to the following.  Some of these are deemed to be lower likelihood and some more likely based on my physical exam and history combined with possible lab work and/or imaging studies.   Please see the pertinent studies, and refer to the HPI.  Some of these diagnoses will take further evaluation to fully rule out, perhaps as an outpatient and the patient was encouraged to follow up when discharged for more comprehensive evaluation.    ACS, pneumonia, COVID/Flu, congestive heart failure, asthma, COPD, pleural effusion, pulmonary edema, acute bronchitis, pneumothorax, hemothorax, aortic dissection, electrolyte abnormalities, anemia, anxiety     Independently Interpreted Test(s):   I have ordered and  independently interpreted EKG Reading(s) - see prior notes  Clinical Tests:   Lab Tests: Ordered and Reviewed  Radiological Study: Ordered and Reviewed  Medical Tests: Reviewed and Ordered  ED Management:  Patient is a 89-year-old male presents to the emergency department for chest pain, shortness of breath, ESRD missed dialysis earlier today.  See HPI.  See physical exam.  Lab work notable for markedly elevated BNP, patient with pulmonary edema on imaging.  Given initial presentation of hypoxia, infiltrates, given 1 time dose of antibiotic but low threshold deescalate low suspicion for any infectious process.  Blood cultures have been obtained.  Discussed with Nephrology for dialysis.  Discussed with cardiology due to elevated troponin.  Discussed with medicine for admission.  All results discussed with the patient and family.  Answered all questions at this time.  Patient family verbalized understanding agreed to plan.  Hemodynamically stable at this time.  Will be taken for emergent dialysis.  Other:   I have discussed this case with another health care provider.        Scribe Attestation:   Scribe #1: I performed the above scribed service and the documentation accurately describes the services I performed. I attest to the accuracy of the note.    Attending Attestation:           Physician Attestation for Scribe:  Physician Attestation Statement for Scribe #1: I, Sudheer Bloom MD, reviewed documentation, as scribed by Jessie Rivers in my presence, and it is both accurate and complete.           ED Course as of 06/05/23 1752   Thu May 18, 2023   2203 Paged Dr. Tinoco.  [MM]   2203 Paged Dr. Gregory. [MM]   2207 X-Ray Chest PA And Lateral [RP]   2208 Spoke with Jonathan Yoon, on call for Dr. Skinny Tinoco.   [RP]   2208 Per chart review patient had recent heart catheterization Akiko.  Unable to visualize results. [RP]   2216 Beronica with renal.  [RP]   2221 Paged Hospital Medicine. [MM]      ED Course User Index  [MM]  Barb Mcmullen  [RP] Sudheer Bloom MD                 Clinical Impression:   Final diagnoses:  [R07.9] Chest pain  [I21.4] NSTEMI (non-ST elevated myocardial infarction)  [N18.6] ESRD (end stage renal disease)  [I50.9] Congestive heart failure, unspecified HF chronicity, unspecified heart failure type  [I50.9] Acute congestive heart failure, unspecified heart failure type        ED Disposition Condition    Admit Stable                Sudheer Bloom MD  06/05/23 6138

## 2023-05-19 NOTE — CONSULTS
[facility]                       [address] [id number]    PATIENT NAME:       TENA FRAGOSO  YOB: 1934  CSN:                [id number]   MRN:                [id number]  ADMIT DATE:         05/18/2023 17:56:00  PHYSICIAN:          Leelee Gregory MD                            CONSULTATION    DATE OF CONSULT:  05/19/2023 00:00:00    REASON FOR CONSULTATION:  Patient with ESRD, needs dialysis and followup.    HISTORY:  This 89 years old black male who is known to me for few years for his   chronic kidney disease and now has developed ESRD and is on dialysis at [facility].  I saw him in the dialysis unit on Tuesday.  He was doing fairly good at   that time.    He has been having some hiccups for the last 3-4 days, which is causing him not   to eat or drink much and progressive shortness of breath and now he also was   having some chest pains.  He said he did see Dr. Jonathan Naylor, who told him that   it may be reflux problem.  Last night, he was given some Benadryl, which made   his hiccups calm down and he has been sleeping ever since.    At present, he is lying down in his bed and his wife is in his room.  He is very   sleepy, lethargic without any hiccup and reports that he is not feeling good.    On oxygen.  No shortness of breath lying down.  Denies any chest pain at present   time or any palpitations.  He has been making urine and he also has some pedal   edema.  No headache, blurring, or diplopia.  No fever or chills.  No cough,   cold, congestion.  Denies constipation.  Denies anorexia.    PAST MEDICAL HISTORY:  Positive for coronary artery disease, coronary artery   bypass graft done in 1995 and the last time, he had any angioplasty and stent   placed was 2 years ago according to the patient and his wife, done by Dr. Jonathan Naylor in his hospital.  Also, he had recent angiogram done few months  ago   according to the wife, but not exactly clear to her, but after that, his kidneys   failed to the point that he needed to be started on hemodialysis.    He had multiple renal cysts.  He also has hypertension, hyperlipidemia,   malignant neoplasm of the prostate, diabetes mellitus type 2, chronic low back   pain.    SURGICAL HISTORY:  Positive for coronary artery bypass graft, right IJ tunneled   dialysis catheter placement.    SOCIAL HISTORY:  Negative for smoking, alcoholism, or drug abuse.    ALLERGIES:  TO IODINE, SULFA, PEMBROLIZUMAB.     FAMILY HISTORY:  Nobody with kidney failure.    REVIEW OF SYSTEMS:  All 12-point review of system was done and is negative except on history of   present illness.    PHYSICAL EXAMINATION:  GENERAL:  He is lethargic, arousable; oriented to place, person, time at present   time.  HEENT:  Atraumatic, normocephalic.  Pupils reactive.  Extraocular movements   intact.  No oral lesion.    NECK:  Supple.  No visible or palpable thyromegaly or lymphadenopathy noted.  No   significant JVD noted.  Pulse ox on oxygen is more than 95%.  LUNGS:  Decreased breath sounds at posterior lung bases.  HEART:  Without rub or gallop.    ABDOMEN:  Soft.  Bowel sounds positive.  No organomegaly.  No rebound.  No   guarding.  EXTREMITIES:  He has 1 to 2+ edema of the lower extremities.  NEUROLOGICAL:  Generalized weakness, but moves all his 4 extremities and no   focal motor deficit.    LABORATORY DATA:  Done on him reveals CBC; white blood cell count 11.07,   hemoglobin 11.4, platelet 209.  Serum sodium 138, potassium 4.6, chloride 99,   bicarb 19, BUN 26, creatinine 6.32, magnesium 2.7, phosphorus 5.6, albumin 3.2.    BNP 18,927.  Troponin elevated.  Lactate 7.2.  Blood cultures have been drawn.    Chest x-ray reveals bilateral mild pulmonary vascular congestion and pleural   effusions and cardiomegaly.  CT chest did not show any pulmonary embolism.    IMPRESSION:    1. End-stage renal disease,  on chronic hemodialysis on TTS.  The patient missed   dialysis yesterday and we will go ahead and start him on dialysis today and   again tomorrow for TTS schedule.  Dr. Jonathan Naylor has been consulted.  2. The patient's gastroenterologist is Dr. Sergio Carroll and since the   patient does have urgent problem of his hiccups and the chest pains, I think I   am going to order some PPIs and we will see if he needs a gastroenterologist.  3. Anemia of chronic disease.  4. History of diabetes mellitus type 2.  5. Bilateral pleural effusions.  6. Cardiomegaly.  7. History of hyperlipidemia.  8. Advanced age.    RECOMMENDATION:    1. Hemodialysis today and again tomorrow and then TTS.  2. Cardiology evaluation.  3. PPI.  4. Need to resume home medications.  5. If Cardiology has no plan to do any procedure, he needs to be resumed on his   diet.    Thank you for this consultation.        ______________________________  MD LUIS DANIEL Manzo/CONNIE  DD:  05/19/2023  Time:  08:23AM  DT:  05/19/2023  Time:  10:17AM  Job #:  [id number]      CONSULTATION

## 2023-05-19 NOTE — PROGRESS NOTES
Pharmacokinetic Initial Assessment: IV Vancomycin    Assessment/Plan:    Initiate intravenous vancomycin with loading dose of 1750 mg once with subsequent doses when random concentrations are less than 20 mcg/mL  Desired empiric serum trough concentration is 15 to 20 mcg/mL  Draw vancomycin random level on 05/20 at 0430.  Pharmacy will continue to follow and monitor vancomycin.      Please contact pharmacy at extension [phone] with any questions regarding this assessment.     Thank you for the consult,   Andres Floreslly       Patient brief summary:  Douglas Quintana is a 89 y.o. male initiated on antimicrobial therapy with IV Vancomycin for treatment of suspected sepsis    Drug Allergies:   Review of patient's allergies indicates:   Allergen Reactions    Pembrolizumab Shortness Of Breath    Iodine Other (See Comments)     Other reaction(s): Not Indicated, Unknown    Sulfa (sulfonamide antibiotics)      Other reaction(s): Unknown       Actual Body Weight:   81.6kg    Renal Function:   Estimated Creatinine Clearance: 8.2 mL/min (A) (based on SCr of 6.32 mg/dL (H)).,     Dialysis Method (if applicable):  intermittent HD    CBC (last 72 hours):  Recent Labs   Lab Result Units 05/18/23  2114 05/19/23  0250   WBC x10(3)/mcL 10.91 11.07   Hgb g/dL 12.6* 11.4*   Hct % 41.1* 36.9*   Platelet x10(3)/mcL 254 209   Mono % % 8.8 12.3   Eos % % 0.0 0.0   Basophil % % 0.2 0.2       Metabolic Panel (last 72 hours):  Recent Labs   Lab Result Units 05/19/23  0051 05/19/23  0250   Sodium Level mmol/L 136 138   Potassium Level mmol/L 4.8 4.6   Chloride mmol/L 98 99   Carbon Dioxide mmol/L 16* 19*   Glucose Level mg/dL 125* 99   Blood Urea Nitrogen mg/dL 25.8* 26.7*   Creatinine mg/dL 6.26* 6.32*   Albumin Level g/dL 3.2* 3.2*   Bilirubin Total mg/dL 2.2* 2.0*   Alkaline Phosphatase unit/L 193* 194*   Aspartate Aminotransferase unit/L 94* 114*   Alanine Aminotransferase unit/L 107* 119*   Magnesium Level mg/dL 2.80* 2.70*   Phosphorus  Level mg/dL 5.8* 5.6*       Drug levels (last 3 results):  No results for input(s): VANCOMYCINRA, VANCORANDOM, VANCOMYCINPE, VANCOPEAK, VANCOMYCINTR, VANCOTROUGH in the last 72 hours.    Microbiologic Results:  Microbiology Results (last 7 days)       Procedure Component Value Units Date/Time    Blood culture #1 **CANNOT BE ORDERED STAT** [[id number]] Collected: 05/19/23 0250    Order Status: Sent Specimen: Blood from Hand, Left Updated: 05/19/23 0251    Blood culture #2 **CANNOT BE ORDERED STAT** [[id number]] Collected: 05/19/23 0250    Order Status: Sent Specimen: Blood from Wrist, Left Updated: 05/19/23 0251

## 2023-05-19 NOTE — DIALYSIS ROUNDING
Patient is currently tolerating dialysis.  Blood flow rate is 300 dialysis flow rate is 600 and he is tolerating very well.  The plan is to remove a only 500 cc.  But hopefully it will help correct the patient's metabolic and lactic acidosis problem.

## 2023-05-19 NOTE — CONSULTS
Cardiovascular Consultation    Patient Name: Douglas Quintana  Age: 89 y.o.  : 1934  MRN: [id number]  Admission Date: 2023  Primary Cardiologist: Alejandrina  ?  Chief Complaint:   Chief Complaint   Patient presents with    Chest Pain     Pt to ER via POV for chest pain.  Also n/v and swelling.  Started yesterday.  Pt missed dialysis today (TTS) due to feeling unwell.  Called Dr Gregory and he requested they bring him to the ER for eval       History of Present Illness:  Douglas Quintana is a 89 y.o. male with past medical history including CAD and prior CABG, HTN, HLD, anemia, and CKD/ESRD on HD.      Patient was recently admitted at another facility where he underwent coronary and bypass angiography on 3-7-2023 with Dr. Tinoco. Procedure report noted stable coronary anatomy with patent bypass grafts. Echo on 3-6-2023 wuth normal LVEF, LVH, and moderate MR and AR.    Patient presented to [facility] ER on 2023 with complaints of chest discomfort that was described as a burning sensation with associated nausea. No vomiting. No significant shortness of breath reported to me.  ON arrival to patient's room, he was sleeping.  When I asked the patient about his chest discomfort (note that I had to wake the patient up) he admitted that the chest pain was ongoing and did not stop.  Patient has been having symptoms for the past 2-3 days.     EKG with LBBB - similar to EKG from [facility] in 2023 prior to [facility]. Patient with lactic acid of 7.2. BNP elevated. BP stable. CXR with signs of volume overload. During exam, note BLE edema. Feet are cold but patient with positive sensation. No palpable pedal pulses noted but difficult on exam given significant edema.     Patient will have HD today.       Review of Systems:  Review of Systems - 12 point review of systems was performed and reviewed with the patient and was negative except as indicated in the History of Present Illness.    Health Status  Review of patient's allergies  "indicates:   Allergen Reactions    Pembrolizumab Shortness Of Breath    Iodine Other (See Comments)     Other reaction(s): Not Indicated, Unknown    Sulfa (sulfonamide antibiotics)      Other reaction(s): Unknown       No past medical history on file.    Current Facility-Administered Medications   Medication Dose Route Frequency Provider Last Rate Last Admin    calcium gluconate 1 g in NS IVPB (premixed)  1 g Intravenous Q10 Min PRN Sudheer Bloom MD        dextrose 10% bolus 250 mL 250 mL  25 g Intravenous PRN Sudheer Bloom MD        diphenhydrAMINE injection 50 mg  50 mg Intravenous Q6H PRN BRANDON Shields   50 mg at 05/19/23 0509    mupirocin 2 % ointment   Nasal BID Leelee Gregory MD        pantoprazole EC tablet 40 mg  40 mg Oral Daily Leelee Gregory MD        piperacillin-tazobactam (ZOSYN) 4.5 g in dextrose 5 % in water (D5W) 5 % 100 mL IVPB (MB+)  4.5 g Intravenous Q12H BRANDON Shields   Stopped at 05/19/23 0815    vancomycin - pharmacy to dose   Intravenous pharmacy to manage frequency BRANDON Shields         No current outpatient medications on file.       No family history on file.    No past surgical history on file.    Social History     Socioeconomic History    Marital status:        Physical Examination:  Vital signs:  Temp:  [97.5 °F (36.4 °C)] 97.5 °F (36.4 °C)  Pulse:  [] 74  Resp:  [10-29] 20  SpO2:  [95 %-100 %] 100 %  BP: ()/(64-96) 120/78  Patient Vitals for the past 8 hrs:   BP Pulse Resp SpO2 Height   05/19/23 0821 120/78 74 20 100 % --   05/19/23 0721 126/86 88 20 100 % --   05/19/23 0601 (!) 130/96 94 19 100 % --   05/19/23 0501 106/75 85 (!) 24 100 % --   05/19/23 0417 118/71 81 -- 100 % --   05/19/23 0357 -- -- -- -- 5' 10" (1.778 m)   05/19/23 0304 -- -- 10 -- --   05/19/23 0301 103/79 91 -- 100 % --        Recent Results (from the past 24 hour(s))   Troponin I #1    Collection Time: 05/18/23  9:14 PM   Result Value Ref Range    Troponin-I 0.833 (H) " 0.000 - 0.045 ng/mL   Troponin I #2    Collection Time: 05/18/23  9:14 PM   Result Value Ref Range    Troponin-I 1.069 (H) 0.000 - 0.045 ng/mL   CBC with Differential    Collection Time: 05/18/23  9:14 PM   Result Value Ref Range    WBC 10.91 4.50 - 11.50 x10(3)/mcL    RBC 4.35 (L) 4.70 - 6.10 x10(6)/mcL    Hgb 12.6 (L) 14.0 - 18.0 g/dL    Hct 41.1 (L) 42.0 - 52.0 %    MCV 94.5 (H) 80.0 - 94.0 fL    MCH 29.0 27.0 - 31.0 pg    MCHC 30.7 (L) 33.0 - 36.0 g/dL    RDW 21.3 (H) 11.5 - 17.0 %    Platelet 254 130 - 400 x10(3)/mcL    MPV 11.1 (H) 7.4 - 10.4 fL    Neut % 76.4 %    Lymph % 14.0 %    Mono % 8.8 %    Eos % 0.0 %    Basophil % 0.2 %    Lymph # 1.53 0.6 - 4.6 x10(3)/mcL    Neut # 8.33 2.1 - 9.2 x10(3)/mcL    Mono # 0.96 0.1 - 1.3 x10(3)/mcL    Eos # 0.00 0 - 0.9 x10(3)/mcL    Baso # 0.02 <=0.2 x10(3)/mcL    IG# 0.07 (H) 0 - 0.04 x10(3)/mcL    IG% 0.6 %    NRBC% 1.3 %   ISTAT CHEM8    Collection Time: 05/18/23 10:25 PM   Result Value Ref Range    POC Glucose 112 (H) 70 - 110 mg/dL    POC BUN 37 (H) 6 - 30 mg/dL    POC Creatinine 6.6 (H) 0.5 - 1.4 mg/dL    POC Sodium 133 (L) 136 - 145 mmol/L    POC Potassium 6.6 (HH) 3.5 - 5.1 mmol/L    POC Chloride 97 95 - 110 mmol/L    POC TCO2 (MEASURED) 23 23 - 29 mmol/L    POC Anion Gap 21 (H) 8 - 16 mmol/L    POC Ionized Calcium 1.05 (L) 1.06 - 1.42 mmol/L    POC Hematocrit 43 36 - 54 %PCV    POC HEMOGLOBIN 15 g/dL    Sample VENOUS    Comprehensive metabolic panel    Collection Time: 05/19/23 12:51 AM   Result Value Ref Range    Sodium Level 136 136 - 145 mmol/L    Potassium Level 4.8 3.5 - 5.1 mmol/L    Chloride 98 98 - 107 mmol/L    Carbon Dioxide 16 (L) 23 - 31 mmol/L    Glucose Level 125 (H) 82 - 115 mg/dL    Blood Urea Nitrogen 25.8 (H) 8.4 - 25.7 mg/dL    Creatinine 6.26 (H) 0.73 - 1.18 mg/dL    Calcium Level Total 9.7 8.8 - 10.0 mg/dL    Protein Total 6.5 5.8 - 7.6 gm/dL    Albumin Level 3.2 (L) 3.4 - 4.8 g/dL    Globulin 3.3 2.4 - 3.5 gm/dL    Albumin/Globulin Ratio  1.0 (L) 1.1 - 2.0 ratio    Bilirubin Total 2.2 (H) <=1.5 mg/dL    Alkaline Phosphatase 193 (H) 40 - 150 unit/L    Alanine Aminotransferase 107 (H) 0 - 55 unit/L    Aspartate Aminotransferase 94 (H) 5 - 34 unit/L    eGFR 8 mls/min/1.73/m2   B-Type natriuretic peptide (BNP)    Collection Time: 05/19/23 12:51 AM   Result Value Ref Range    Natriuretic Peptide 18,927.3 (H) <=100.0 pg/mL   Magnesium    Collection Time: 05/19/23 12:51 AM   Result Value Ref Range    Magnesium Level 2.80 (H) 1.60 - 2.60 mg/dL   Phosphorus    Collection Time: 05/19/23 12:51 AM   Result Value Ref Range    Phosphorus Level 5.8 (H) 2.3 - 4.7 mg/dL   Lactic acid, plasma    Collection Time: 05/19/23  2:50 AM   Result Value Ref Range    Lactic Acid Level 7.2 (HH) 0.5 - 2.2 mmol/L   Comprehensive Metabolic Panel    Collection Time: 05/19/23  2:50 AM   Result Value Ref Range    Sodium Level 138 136 - 145 mmol/L    Potassium Level 4.6 3.5 - 5.1 mmol/L    Chloride 99 98 - 107 mmol/L    Carbon Dioxide 19 (L) 23 - 31 mmol/L    Glucose Level 99 82 - 115 mg/dL    Blood Urea Nitrogen 26.7 (H) 8.4 - 25.7 mg/dL    Creatinine 6.32 (H) 0.73 - 1.18 mg/dL    Calcium Level Total 9.8 8.8 - 10.0 mg/dL    Protein Total 6.3 5.8 - 7.6 gm/dL    Albumin Level 3.2 (L) 3.4 - 4.8 g/dL    Globulin 3.1 2.4 - 3.5 gm/dL    Albumin/Globulin Ratio 1.0 (L) 1.1 - 2.0 ratio    Bilirubin Total 2.0 (H) <=1.5 mg/dL    Alkaline Phosphatase 194 (H) 40 - 150 unit/L    Alanine Aminotransferase 119 (H) 0 - 55 unit/L    Aspartate Aminotransferase 114 (H) 5 - 34 unit/L    eGFR 8 mls/min/1.73/m2   Magnesium    Collection Time: 05/19/23  2:50 AM   Result Value Ref Range    Magnesium Level 2.70 (H) 1.60 - 2.60 mg/dL   Phosphorus    Collection Time: 05/19/23  2:50 AM   Result Value Ref Range    Phosphorus Level 5.6 (H) 2.3 - 4.7 mg/dL   CBC with Differential    Collection Time: 05/19/23  2:50 AM   Result Value Ref Range    WBC 11.07 4.50 - 11.50 x10(3)/mcL    RBC 3.99 (L) 4.70 - 6.10 x10(6)/mcL     Hgb 11.4 (L) 14.0 - 18.0 g/dL    Hct 36.9 (L) 42.0 - 52.0 %    MCV 92.5 80.0 - 94.0 fL    MCH 28.6 27.0 - 31.0 pg    MCHC 30.9 (L) 33.0 - 36.0 g/dL    RDW 21.0 (H) 11.5 - 17.0 %    Platelet 209 130 - 400 x10(3)/mcL    MPV 11.1 (H) 7.4 - 10.4 fL    Neut % 72.3 %    Lymph % 14.7 %    Mono % 12.3 %    Eos % 0.0 %    Basophil % 0.2 %    Lymph # 1.63 0.6 - 4.6 x10(3)/mcL    Neut # 8.01 2.1 - 9.2 x10(3)/mcL    Mono # 1.36 (H) 0.1 - 1.3 x10(3)/mcL    Eos # 0.00 0 - 0.9 x10(3)/mcL    Baso # 0.02 <=0.2 x10(3)/mcL    IG# 0.05 (H) 0 - 0.04 x10(3)/mcL    IG% 0.5 %    NRBC% 1.7 %     [unfilled]  Wt Readings from Last 3 Encounters:   05/18/23 81.6 kg (180 lb)         Physical Exam   Constitutional: Oriented to person, place, and time and well-developed, well-nourished, and in no distress.   Eyes: Conjunctivae and EOM are normal. Pupils are equal, round, and reactive to light.   Neck: Normal range of motion. Neck supple.   Cardiovascular: Normal rate, regular rhythm and normal heart sounds.   Pulmonary/Chest: Effort normal and breath sounds normal.   Abdominal: Soft. Bowel sounds are normal. There is no tenderness.   Musculoskeletal: Normal range of motion.   Neurological: Alert and oriented to person, place, and time. Gait normal.   Skin: Skin is warm and dry.   Psychiatric: Affect normal.       Assessment/Plan:    Chest discomfort/NSTEMI with known CAD and prior CABG  -initial troponin 1.06 -> continue to trend  -recent [facility] on 3-7-2023 with stable anatomy and patent bypass grafts  -echo 3-6-23 with normal LV systolic function  -EKG similar to prior with LBBB  -will likely plan to continue trend troponins, resume home medical therapy when reconciled, and plan for volume removal  -troponin likely elevated given renal dysfunction and volume overload noted on admission  -start Aspirin 81 mg daily    2. Lactic acidosis  -lactate 7.2  -blood cultures are pending  -further plan per internal medicine team    3. ESRD on  HD  -nephrology consulted  -HD today    4. HTN  -resume home medical therapy when home meds reconciled    5. HLD  -statin therapy when home meds available for review        *Patient of Dr. Tinoco.         KASHIF Danielson, FNP-C  [facility]

## 2023-05-19 NOTE — H&P
[facility] Hospital Medicine History & Physical Examination       Patient Name: Douglas Quintana  MRN: [id number]  Patient Class: IP- Inpatient   Admission Date: 05/19/2023   Admitting Service: Hospital Medicine   Length of Stay: 0  Attending Physician: Aide Quan MD  Primary Care Provider: BRANDON CHILDERS  Face-to-Face encounter date: 05/19/2023  Code Status: Full  Chief Complaint: Chest Pain (Pt to ER via POV for chest pain.  Also n/v and swelling.  Started yesterday.  Pt missed dialysis today (TTS) due to feeling unwell.  Called Dr Gregory and he requested they bring him to the ER for eval)    Present at Bedside: Wife  Source of Information: Patient. Medical Records      HISTORY OF PRESENT ILLNESS:   Douglas Quintana is a 89 y.o. male with a PMHx of ESRD on HD (TTS), CAD status post MI/CABG, DM type 2, Essential hypertension, hyperlipidemia, GERD, diabetic neuropathy, valvular heart disease-aortic stenosis, bilateral carotid stenosis, NICMO, chronic CHF (60%) 2018, history of prostate cancer who presented to [facility] on 5/18/2023 with c/o chronic left-sided chest pain that has been ongoing x3 months but worsened x1 day ago.  Patient reportedly missed HD on 05/18/2023.  He was referred to ED by his nephrologist, Dr. Gregory, for further evaluation.  Patient's wife also reported bilateral lower extremity swelling.    Initial ED VS include /72, HR 93, RR 20, SpO2 97% on 2 L nasal cannula, temperature 97.5° F.  POC potassium noted to be 6.6 for which he was given temporizing measures.  Labs were then repeated and notable for hemoglobin 12.6, hematocrit 41.1, CO2 16, BUN 25.8, creatinine 6.26, glucose 125, phosphorus 5.8, magnesium 2.8, alk-phos 193, albumin 3.2, total bili 2.2, AST 94, .  BNP 18,927.  Lactic acid 7.2.  Initial troponin 1.069.  CXR demonstrated enlarged cardiac silhouette with mild vascular congestion and probable trace effusions.  CT chest  abdomen and pelvis with contrast negative for PE; moderate flurry fluid seen in pelvis; bladder is nondistended however the bladder wall appears thickened and could reflect an element of cystitis; moderate bilateral pleural effusions associated with lower lobe collapse and dependent ground-glass densities and posterior segment of upper lobes; mild edematous thickening of the cecum and ascending colon.  Blood cultures x2 obtained.  He was started on broad-spectrum IV antibiotics.  Nephrology and cardiology Services were consulted in ED. Admitted to hospital medicine services for further workup and management of care.      REVIEW OF SYSTEMS:   Except as documented, all other systems reviewed and negative     PAST MEDICAL HISTORY:   ESRD on HD, CAD status post MI/CABG, DM type 2, Essential hypertension, hyperlipidemia, GERD, diabetic neuropathy, valvular heart disease-aortic stenosis, bilateral carotid stenosis, NICMO, chronic CHF (60%) 2018, history of prostate cancer    PAST SURGICAL HISTORY:   CABG  Coronary angiogram  Appendectomy   Bilateral cataract extraction   EGD   Right tunneled HD catheter insertion    FAMILY HISTORY:   Reviewed and negative    SOCIAL HISTORY:   Denied alcohol, tobacco or illicit drug use    ALLERGIES:   Pembrolizumab, Iodine, and Sulfa (sulfonamide antibiotics)    HOME MEDICATIONS:     Prior to Admission medications    Not on File     ________________________________________________________________________  INPATIENT LIST OF MEDICATIONS     Current Facility-Administered Medications:     [COMPLETED] calcium gluconate 1 g in NS IVPB (premixed), 1 g, Intravenous, ED 1 Time, Stopped at 05/18/23 2326 **AND** calcium gluconate 1 g in NS IVPB (premixed), 1 g, Intravenous, Q10 Min PRN, Sudheer Bloom MD    [COMPLETED] dextrose 10% bolus 500 mL 500 mL, 50 g, Intravenous, Once, Stopped at 05/18/23 2342 **AND** dextrose 10% bolus 250 mL 250 mL, 25 g, Intravenous, PRN **AND** [COMPLETED] insulin  regular injection 5 Units 0.05 mL, 5 Units, Intravenous, Once, Sudheer Bloom MD, 5 Units at 05/18/23 2313    diphenhydrAMINE injection 50 mg, 50 mg, Intravenous, Q6H PRN, BRANDON Shields, 50 mg at 05/19/23 0509    piperacillin-tazobactam (ZOSYN) 4.5 g in dextrose 5 % in water (D5W) 5 % 100 mL IVPB (MB+), 4.5 g, Intravenous, Q12H, BRANDON Shields, Last Rate: 25 mL/hr at 05/19/23 0415, 4.5 g at 05/19/23 0415    Pharmacy to dose Vancomycin consult, , , Once **AND** vancomycin - pharmacy to dose, , Intravenous, pharmacy to manage frequency, BRANDON Shields  No current outpatient medications on file.    Scheduled Meds:   piperacillin-tazobactam (ZOSYN) IVPB  4.5 g Intravenous Q12H     Continuous Infusions:  PRN Meds:.[COMPLETED] calcium gluconate IVPB **AND** calcium gluconate IVPB, [COMPLETED] dextrose 10% **AND** dextrose 10% **AND** [COMPLETED] insulin regular, diphenhydrAMINE, Pharmacy to dose Vancomycin consult **AND** vancomycin - pharmacy to dose    PHYSICAL EXAM:     VITAL SIGNS: 24 HRS MIN & MAX LAST   Temp  Min: 97.5 °F (36.4 °C)  Max: 97.5 °F (36.4 °C) 97.5 °F (36.4 °C)   BP  Min: 85/64  Max: 130/96 (!) 130/96   Pulse  Min: 78  Max: 143  94   Resp  Min: 10  Max: 29 19   SpO2  Min: 95 %  Max: 100 % 100 %       General appearance: Well-developed AA male in no apparent distress.  HENT: Atraumatic head. Moist mucous membranes of oral cavity.  Eyes: Normal extraocular movements.   Neck: Supple.   Lungs: Clear to auscultation bilaterally.   Heart: Regular rate and rhythm. S1 and S2 present. BLE pedal edema.  Abdomen: Soft, non-distended, non-tender.  Extremities: No cyanosis, clubbing, or edema.  Skin: No Rash.   Neuro: Motor and sensory exams grossly intact.   Psych/mental status: Appropriate mood and affect. Responds appropriately to questions.     LABS AND IMAGING:     Recent Labs   Lab 05/18/23  2114 05/18/23  2225 05/19/23  0250   WBC 10.91  --  11.07   RBC 4.35*  --  3.99*   HGB 12.6*  --  11.4*    HCT 41.1* 43 36.9*   MCV 94.5*  --  92.5   MCH 29.0  --  28.6   MCHC 30.7*  --  30.9*   RDW 21.3*  --  21.0*     --  209   MPV 11.1*  --  11.1*       Recent Labs   Lab 05/19/23  0051 05/19/23 0250    138   K 4.8 4.6   CO2 16* 19*   BUN 25.8* 26.7*   CREATININE 6.26* 6.32*   CALCIUM 9.7 9.8   MG 2.80* 2.70*   ALBUMIN 3.2* 3.2*   ALKPHOS 193* 194*   * 119*   AST 94* 114*   BILITOT 2.2* 2.0*       Microbiology Results (last 7 days)       Procedure Component Value Units Date/Time    Blood culture #1 **CANNOT BE ORDERED STAT** [[id number]] Collected: 05/19/23 0250    Order Status: Resulted Specimen: Blood from Hand, Left Updated: 05/19/23 0251    Blood culture #2 **CANNOT BE ORDERED STAT** [[id number]] Collected: 05/19/23 0250    Order Status: Resulted Specimen: Blood from Wrist, Left Updated: 05/19/23 0251             CT Chest Abdomen Pelvis With Contrast (xpd)  START OF REPORT:  TECHNIQUE: CT SCAN OF THE CHEST ABDOMEN AND PELVIS WAS PERFORMED WITH INTRAVENOUS CONTRAST WITH AXIAL AS WELL AS SAGITTAL AND, CORONAL IMAGES.    DOSAGE INFORMATION: AUTOMATED EXPOSURE CONTROL WAS UTILIZED.    COMPARISON: NONE.    CLINICAL HISTORY: SEPSIS.    Findings:  Soft Tissues: Unremarkable.  Lines and Tubes: Right internal jugular, catheter is present with its tip in the superior vena cave.  Neck: The visualized soft tissues of the neck appear unremarkable. The thyroid gland appear unremarkable.  Mediastinum: A few subcentimeter mediastinal lymph nodes are seen paratracheal and precarinal and subcarinal . This suggests reactive lymphadenopathy.  Heart: Mild cardiomegaly is seen. Coronary artery calcification is seen. The patient is status post median sternotomy.  Aorta: Unremarkable appearing aorta. Mild aortic calcification is seen in the arch thoracic aorta.  Pulmonary Arteries: No filling defects are seen in the pulmonary arteries to suggest pulmonary embolus to the sensitivity of the study.  Lungs: There are  moderate bilateral pleural effusions associated with lower lobe collapse and dependent groundglass densities in posterior segments of upper lobes. Otherwise the lungs are clear with no focal infiltrates or consolidation.  Pleura: There is a large right sided pleural effusion. There is a large left sided pleural effusion.  Bony Structures:  Spine: Moderate spondylolytic changes are seen in the thoracic spine.  Ribs: No rib fractures are identified.  Abdomen:  Abdominal Wall: There is extensive stranding of the subcutaneous fat suggesting an element of anasarca edema of uncertain etiology. The abdominal wall otherwise appears unremarkable.  Liver: The liver otherwise appears unremarkable.  Biliary System: No intrahepatic or extrahepatic biliary duct dilatation is seen.  Gallbladder: The gallbladder is non-distended and appears otherwise unremarkable.  Pancreas: The pancreas appears unremarkable.  Spleen: The spleen appears unremarkable.  Adrenals: The adrenal glands appear unremarkable.  Kidneys: Multiple simple cortical cysts in bilateral kidneys are noted. The kidneys otherwise appear unremarkable with no stones or masses or hydronephrosis.  Aorta: The abdominal aorta appears unremarkable.  IVC: Unremarkable.  Bowel:  Esophagus: The visualized esophagus appears unremarkable.  Stomach: The stomach appears unremarkable.  Duodenum: Unremarkable appearing duodenum.  Small Bowel: The small bowel appears unremarkable.  Colon: Nondistended. There is mild edematous thickening of the cecum and the ascending colon.  Appendix: The appendix is not identified but no inflammatory changes are seen in the right lower quadrant to suggest appendicitis.  Peritoneum: No free intraperitoneal air is seen. Moderate free fluid is seen in the pelvis.    Pelvis:  Bladder: The bladder is nondistended however the bladder wall appears thickened after considering state of nondistension which could reflect an element of cystitis. There is air in  the lumen of urinary bladder. Correlate clinically with regards to recent catheterisation.  Male:  Prostate gland: There are multiple punctate hyperdensities in the prostate gland. These are consistent with brachytherapy beads.    Bony structures:  Dorsal Spine: There is moderate multilevel spondylosis of the visualized dorsal spine.  Bony Pelvis: The visualized bony structures of the pelvis appear unremarkable.    Impression:  1. No filling defects are seen in the pulmonary arteries to suggest pulmonary embolus to the sensitivity of the study.  2. Moderate free fluid is seen in the pelvis.  3. The bladder is nondistended however the bladder wall appears thickened after considering state of nondistension which could reflect an element of cystitis.  4. There are moderate bilateral pleural effusions associated with lower lobe collapse and dependent groundglass densities in posterior segments of upper lobes. Otherwise the lungs are clear with no focal infiltrates or consolidation.  5. There is mild edematous thickening of the cecum and the ascending colon. Correlate with clinical and laboratory findings with regards to colitis.  6. Details and other findings as discussed above.        ASSESSMENT & PLAN:   ESRD on HD(TTS) with Fluid volume overload  NSTEMI type 2 secondary to above   Chronic CHF  Bilateral pleural effusions  Lactic acidosis   Metabolic acidosis  Hyperkalemia-resolved  Hyperbilirubinemia and Transaminitis, likely 2/2 hepatic congestion  CAD status post MI/CABG   DM2  Essential hypertension   Normocytic Anemia  History of hyperlipidemia, GERD, diabetic neuropathy, valvular heart disease-aortic stenosis, bilateral carotid stenosis, history of prostate cancer     Plan:   Nephrology consulted for inpatient HD   Cardiology consult, appreciate recommendations   Cardiac monitoring  Trend troponin x3   Empiric IV antibiotics  Follow blood cultures  Trend lactate level  Accu-Cheks with insulin sliding scale  "  Resume appropriate home medications once updated   Labs in a.m.    VTE Prophylaxis: SCDs    Discharge Planning and Disposition: TBD    I, Nancy Granados, NP have reviewed and discussed the case with Aide Quan MD  Please see the attending MD's addendum for further assessment and plan.    Nancy Granados, AGACNP-BC  05/19/2023    _______________________________________________________________________________  MD Addendum:  I, Dr.Praneet Avelina MD  assumed care of this patient today   For the patient encounter, I performed the substantive portion of the visit, I reviewed the NP/PA documentation, treatment plan, and medical decision making.  I had face to face time with this patient     89-year-old male with  medical history of ESRD on HD , CHF unspecified type, CAD with prior CABG follows Dr. Tinoco with stable coronary anatomy with patent bypass grafts 03/07/2023, anemia presented to ED for evaluation of dry heaving chest discomfort and worsening pedal edema in the past 2 days.  History is mostly obtained from the wife was next to bedside patient received Benadryl 50x2 for CT scan so patient was quite lethargic during my interview.  The current problem started 2 days ago with intractable dry heaving nausea weak tired lethargy and unable to eat and missed his hemodialysis due to feeling unwell and presented to the ED. denied any fevers chills.  On exam patient quite lethargic but following commands.  His hands and feet are very cold and noted significant pedal edema.  Heart rate controlled rhythm regular lungs diminished breath sounds bilateral bases poor effort.  Abdomen soft nontender .  Labs and imaging reviewed noted troponin elevation initially then trended down, lactic acidosis improving.  BNP 18,927  Imaging showed bilateral pleural effusions with vascular condition multiple other findings      Medical decision making:  NSTEMI ?type 2 event   CHF unspecified type-decompensated state "wet and " "cold"  ESRD on HD with missed HD  Troponinemia  Lactic acidosis      Nephrology on board.  Appreciate inputs.  Patient had ultrafiltration 500 cc today.  Volume removal per Nephrology. Planning on hemodialysis session tomorrow   cardiology on board.  Appreciate input.  I will obtain echocardiogram given significant troponin elevation with current clinical picture with "wet and cold"  We will give IV Lasix patient is still making urine  His blood pressures in the range of 90s-120s hold Ace or arbs afterload already reduced  Trend troponin, EKG did show left bundle branch block but per Cards report which was similar to his previous EKG in March  Suspected lactic acid from poor perfusion in the setting of ESRD  Continue PPI  Patient is empirically initiated on vancomycin and Zosyn for presumed sepsis check MRSA and discontinue vancomycin if needed follow up blood cultures and recommend deescalate antibiotics as the clinical picture is mostly volume overload with heart failure and ESRD  I will hold his home beta-blocker in the current setting  Reviewed outpatient meds from the 04/20/2023 office visit patient is on aspirin 81, vitamin D3, famotidine, Lasix 20 once daily gabapentin, glimepiride, hydralazine 20 Toprol-XL 25, PPI 40  Obtain home medications and update in the chart  Monitor on tele neuro checks q.4 for 24 hours    Code status: DNR    Critical care time spent:  40 minutes   Critical care diagnosis:  Volume overload, lactic acidosis, CHF exac, NSTEMI   Critical care intervention:  Chart review, lab review, hands on evaluation, care discussion      05/19/2023       "

## 2023-05-19 NOTE — NURSING
05/19/23 1206   Post-Hemodialysis Assessment   Blood Volume Processed (Liters) 42.8 L   Dialyzer Clearance Clear   Duration of Treatment 150 minutes   Total UF (mL) 500 mL   Patient Response to Treatment Pt tolerated HD tx well; NAD noted/ VSS. Total tx length 2.5hrs with 500ml net UF goal. Pt was ran on HD for off-schedule reason; will continue scheduled TTS.   Post-Hemodialysis Comments Pt deaccessed per P and P

## 2023-05-19 NOTE — NURSING
Nurses Note -- 4 Eyes      5/19/2023   5:36 PM      Skin assessed during: Admit      [x] No Altered Skin Integrity Present    []Prevention Measures Documented      [] Yes- Altered Skin Integrity Present or Discovered   [] LDA Added if Not in Epic (Describe Wound)   [] New Altered Skin Integrity was Present on Admit and Documented in LDA   [] Wound Image Taken    Wound Care Consulted? No    Attending Nurse:  Mirna Del Cid LPN     Second RN/Staff Member: [email or web address]

## 2023-05-20 VITALS
DIASTOLIC BLOOD PRESSURE: 81 MMHG | SYSTOLIC BLOOD PRESSURE: 118 MMHG | BODY MASS INDEX: 26.35 KG/M2 | HEIGHT: 70 IN | WEIGHT: 184.06 LBS | TEMPERATURE: 98 F | OXYGEN SATURATION: 100 % | RESPIRATION RATE: 18 BRPM | HEART RATE: 81 BPM

## 2023-05-20 PROBLEM — I50.41 ACUTE COMBINED SYSTOLIC AND DIASTOLIC HEART FAILURE: Status: ACTIVE | Noted: 2023-05-20

## 2023-05-20 PROCEDURE — 63600175 PHARM REV CODE 636 W HCPCS: Performed by: INTERNAL MEDICINE

## 2023-05-20 PROCEDURE — 63600175 PHARM REV CODE 636 W HCPCS: Performed by: NURSE PRACTITIONER

## 2023-05-20 PROCEDURE — 25000003 PHARM REV CODE 250: Performed by: NURSE PRACTITIONER

## 2023-05-20 RX ORDER — CLOPIDOGREL BISULFATE 75 MG/1
75 TABLET ORAL DAILY
Status: DISCONTINUED | OUTPATIENT
Start: 2023-05-20 | End: 2023-05-20 | Stop reason: HOSPADM

## 2023-05-20 RX ADMIN — PIPERACILLIN AND TAZOBACTAM 4.5 G: 4; .5 INJECTION, POWDER, LYOPHILIZED, FOR SOLUTION INTRAVENOUS; PARENTERAL at 04:05

## 2023-05-20 RX ADMIN — FUROSEMIDE 40 MG: 10 INJECTION, SOLUTION INTRAMUSCULAR; INTRAVENOUS at 04:05

## 2023-05-20 NOTE — NURSING
Nurses Note -- 4 Eyes      5/19/2023   8:00 PM      Skin assessed during: Admit      [x] No Altered Skin Integrity Present    []Prevention Measures Documented      [] Yes- Altered Skin Integrity Present or Discovered   [] LDA Added if Not in Epic (Describe Wound)   [] New Altered Skin Integrity was Present on Admit and Documented in LDA   [] Wound Image Taken    Wound Care Consulted? No    Attending Nurse:  Karson Delaney RN     Second RN/Staff Member:  Nanci Abdul RN

## 2023-05-20 NOTE — PLAN OF CARE
Problem: Adult Inpatient Plan of Care  Goal: Plan of Care Review  Outcome: Ongoing, Progressing  Goal: Patient-Specific Goal (Individualized)  Outcome: Ongoing, Progressing  Goal: Absence of Hospital-Acquired Illness or Injury  Outcome: Ongoing, Progressing  Goal: Optimal Comfort and Wellbeing  Outcome: Ongoing, Progressing  Goal: Readiness for Transition of Care  Outcome: Ongoing, Progressing     Problem: Device-Related Complication Risk (Hemodialysis)  Goal: Safe, Effective Therapy Delivery  Outcome: Ongoing, Progressing     Problem: Hemodynamic Instability (Hemodialysis)  Goal: Effective Tissue Perfusion  Outcome: Ongoing, Progressing     Problem: Infection (Hemodialysis)  Goal: Absence of Infection Signs and Symptoms  Outcome: Ongoing, Progressing     Problem: Infection  Goal: Absence of Infection Signs and Symptoms  Outcome: Ongoing, Progressing

## 2023-05-20 NOTE — PROGRESS NOTES
"NEPHROLOGY PROGRESS NOTE      Patient Demographics:  Name:  Douglas Quintana  Age: 89 y.o.  MRN:  [id number]  Admission Date: 5/18/2023      Subjective:      Resting quietly    Home with hospice today  No further HD    Family at bedside    I spoke with a daughter by phone yesterday afternoon re: hospice    Current Facility-Administered Medications   Medication Dose Route Frequency Provider Last Rate Last Admin    aspirin chewable tablet 81 mg  81 mg Oral Daily Brigitte TIAN Pulido, FNP   81 mg at 05/19/23 1404    calcium gluconate 1 g in NS IVPB (premixed)  1 g Intravenous Q10 Min PRN Sudheer Bloom MD        clopidogreL tablet 75 mg  75 mg Oral Daily Jonathan Lam Jr., MD        diphenhydrAMINE injection 50 mg  50 mg Intravenous Q6H PRN UMM ShieldsP   50 mg at 05/19/23 1245    furosemide injection 40 mg  40 mg Intravenous Q12H Aide Quan MD   40 mg at 05/20/23 0448    mupirocin 2 % ointment   Nasal BID Leelee Gregory MD   Given at 05/19/23 2241    nitroGLYCERIN SL tablet 0.4 mg  0.4 mg Sublingual Q5 Min PRN Nancy Granados, AGACNP-BC   0.4 mg at 05/19/23 1624    ondansetron injection 4 mg  4 mg Intravenous Q6H PRN Jason Marinelli, FNP   4 mg at 05/19/23 2240    pantoprazole EC tablet 40 mg  40 mg Oral Daily Leelee Gregory MD        piperacillin-tazobactam (ZOSYN) 4.5 g in dextrose 5 % in water (D5W) 5 % 100 mL IVPB (MB+)  4.5 g Intravenous Q12H BRANDON Shields   Stopped at 05/20/23 0848           Review of Systems   Reason unable to perform ROS: deferred.       Objective:    /81   Pulse 81   Temp 98.2 °F (36.8 °C)   Resp 18   Ht 5' 10" (1.778 m)   Wt 83.5 kg (184 lb 1.4 oz)   SpO2 100%   BMI 26.41 kg/m²       Intake/Output Summary (Last 24 hours) at 5/20/2023 1438  Last data filed at 5/20/2023 0448  Gross per 24 hour   Intake 440 ml   Output 25 ml   Net 415 ml             Physical Exam  Vitals reviewed.      Deferred    Inpatient Diagnostics:  Recent Results (from the past 24 " hour(s))   POCT glucose    Collection Time: 05/19/23  3:37 PM   Result Value Ref Range    POCT Glucose 106 70 - 110 mg/dL   Echo    Collection Time: 05/19/23  5:19 PM   Result Value Ref Range    BSA 2.01 m2    TDI SEPTAL 0.05 m/s    LV LATERAL E/E' RATIO 13.50 m/s    LV SEPTAL E/E' RATIO 16.20 m/s    Right Atrial Pressure (from IVC) 8 mmHg    EF 15 %    Left Ventricular Outflow Tract Mean Velocity 0.26 cm/s    Left Ventricular Outflow Tract Mean Gradient 0.00 mmHg    TDI LATERAL 0.06 m/s    PV PEAK VELOCITY 0.86 cm/s    LVIDd 5.99 3.5 - 6.0 cm    IVS 1.16 (A) 0.6 - 1.1 cm    Posterior Wall 1.07 0.6 - 1.1 cm    LVIDs 5.32 (A) 2.1 - 4.0 cm    FS 11 28 - 44 %    LV mass 283.89 g    LA size 3.50 cm    TAPSE 0.96 cm    Left Ventricle Relative Wall Thickness 0.36 cm    AV mean gradient 9 mmHg    AV valve area 0.78 cm2    AV Velocity Ratio 0.24     AV index (prosthetic) 0.25     MV mean gradient 2 mmHg    MV valve area p 1/2 method 4.15 cm2    MV valve area by continuity eq 0.98 cm2    E/A ratio 2.13     Mean e' 0.06 m/s    E wave deceleration time 121.00 msec    LVOT diameter 2.00 cm    LVOT area 3.1 cm2    LVOT peak jose cruz 0.41 m/s    LVOT peak VTI 7.10 cm    Ao peak jose cruz 1.74 m/s    Ao VTI 28.5 cm    LVOT stroke volume 22.29 cm3    AV peak gradient 12 mmHg    MV peak gradient 4 mmHg    TV rest pulmonary artery pressure 40 mmHg    E/E' ratio 14.73 m/s    MV Peak E Jose Cruz 0.81 m/s    TR Max Jose Cruz 2.81 m/s    MV VTI 22.8 cm    MV stenosis pressure 1/2 time 53.00 ms    MV Peak A Jose Cruz 0.38 m/s    LV Systolic Volume 137.00 mL    LV Systolic Volume Index 68.5 mL/m2    LV Diastolic Volume 179.00 mL    LV Diastolic Volume Index 89.50 mL/m2    LV Mass Index 142 g/m2    Triscuspid Valve Regurgitation Peak Gradient 32 mmHg    RA Width 4.36 cm     A/P--NE 05/20    1---ESRD on HD---No further HD--Patient desires Hospice  2---N/V--Supportive care  3---CP--Cardiology eval noted  4---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

## 2023-05-20 NOTE — PROGRESS NOTES
Cardiology Daily Progress Note    Patient Name: Douglas Quintana  Age: 89 y.o.  : 1934  MRN: [id number]  Admission Date: 2023      Subjective: No acute cardiac events overnight. Patient reports chest discomfort described as a burning sensation. Patient states he is tired and weak. Patient and daughter would like to consider palliative care at this time.       Telemetry: Reviewed      Review of Systems -   General ROS: negative.  Respiratory ROS: no cough, shortness of breath, or wheezing.  Cardiovascular ROS: no chest pain or dyspnea on exertion.  Gastrointestinal ROS: no abdominal pain, change in bowel habits, or black or bloody stools.  Genito-Urinary ROS: no dysuria, trouble voiding, or hematuria.  Musculoskeletal ROS: negative.  Neurological ROS: negative.      Health Status:  Review of patient's allergies indicates:   Allergen Reactions    Pembrolizumab Shortness Of Breath    Iodine Other (See Comments)     Other reaction(s): Not Indicated, Unknown    Sulfa (sulfonamide antibiotics)      Other reaction(s): Unknown       Current Facility-Administered Medications   Medication Dose Route Frequency Provider Last Rate Last Admin    aspirin chewable tablet 81 mg  81 mg Oral Daily Brigitte TIAN Pulido, FNP   81 mg at 23 1404    calcium gluconate 1 g in NS IVPB (premixed)  1 g Intravenous Q10 Min PRN Sudheer Bloom MD        diphenhydrAMINE injection 50 mg  50 mg Intravenous Q6H PRN Deidre Capellan, FNP   50 mg at 23 1245    furosemide injection 40 mg  40 mg Intravenous Q12H Aide Quan MD   40 mg at 23 0448    mupirocin 2 % ointment   Nasal BID Leelee Gregory MD   Given at 23 2241    nitroGLYCERIN SL tablet 0.4 mg  0.4 mg Sublingual Q5 Min PRN Nancy Granados, AGACNP-BC   0.4 mg at 23 1624    ondansetron injection 4 mg  4 mg Intravenous Q6H PRN Jason Marinelli, FNP   4 mg at 23 2240    pantoprazole EC tablet 40 mg  40 mg Oral Daily Leelee Gregory MD         "piperacillin-tazobactam (ZOSYN) 4.5 g in dextrose 5 % in water (D5W) 5 % 100 mL IVPB (MB+)  4.5 g Intravenous Q12H BRANDON Shields 25 mL/hr at 05/20/23 0448 4.5 g at 05/20/23 0448    vancomycin - pharmacy to dose   Intravenous pharmacy to manage frequency BRANDON Shields           Objective:  Patient Vitals for the past 24 hrs:   BP Temp Temp src Pulse Resp SpO2 Height Weight   05/20/23 0540 118/81 98.2 °F (36.8 °C) Oral 81 -- 100 % -- --   05/20/23 0535 -- -- -- -- -- -- -- 83.5 kg (184 lb 1.4 oz)   05/20/23 0247 (!) 105/58 97.2 °F (36.2 °C) Axillary 76 18 100 % -- --   05/19/23 2350 112/79 97.4 °F (36.3 °C) Oral 84 17 100 % -- --   05/19/23 2002 133/85 -- -- -- 19 100 % -- --   05/19/23 1958 110/79 97.7 °F (36.5 °C) -- 82 20 100 % 5' 10" (1.778 m) 81.6 kg (179 lb 14.3 oz)   05/19/23 1621 110/79 -- -- 82 -- 100 % 5' 10" (1.778 m) 81.6 kg (179 lb 14.3 oz)   05/19/23 1600 116/70 97.7 °F (36.5 °C) -- 87 20 100 % -- --   05/19/23 1225 116/76 -- -- 84 20 100 % -- --   05/19/23 0821 120/78 -- -- 74 20 100 % -- --     Recent Results (from the past 24 hour(s))   Lactic acid, plasma    Collection Time: 05/19/23  1:54 PM   Result Value Ref Range    Lactic Acid Level 2.9 (H) 0.5 - 2.2 mmol/L   POCT glucose    Collection Time: 05/19/23  3:37 PM   Result Value Ref Range    POCT Glucose 106 70 - 110 mg/dL   Echo    Collection Time: 05/19/23  5:19 PM   Result Value Ref Range    BSA 2.01 m2    TDI SEPTAL 0.05 m/s    LV LATERAL E/E' RATIO 13.50 m/s    LV SEPTAL E/E' RATIO 16.20 m/s    Right Atrial Pressure (from IVC) 8 mmHg    EF 15 %    Left Ventricular Outflow Tract Mean Velocity 0.26 cm/s    Left Ventricular Outflow Tract Mean Gradient 0.00 mmHg    TDI LATERAL 0.06 m/s    PV PEAK VELOCITY 0.86 cm/s    LVIDd 5.99 3.5 - 6.0 cm    IVS 1.16 (A) 0.6 - 1.1 cm    Posterior Wall 1.07 0.6 - 1.1 cm    LVIDs 5.32 (A) 2.1 - 4.0 cm    FS 11 28 - 44 %    LV mass 283.89 g    LA size 3.50 cm    TAPSE 0.96 cm    Left Ventricle Relative " Wall Thickness 0.36 cm    AV mean gradient 9 mmHg    AV valve area 0.78 cm2    AV Velocity Ratio 0.24     AV index (prosthetic) 0.25     MV mean gradient 2 mmHg    MV valve area p 1/2 method 4.15 cm2    MV valve area by continuity eq 0.98 cm2    E/A ratio 2.13     Mean e' 0.06 m/s    E wave deceleration time 121.00 msec    LVOT diameter 2.00 cm    LVOT area 3.1 cm2    LVOT peak jose cruz 0.41 m/s    LVOT peak VTI 7.10 cm    Ao peak jose cruz 1.74 m/s    Ao VTI 28.5 cm    LVOT stroke volume 22.29 cm3    AV peak gradient 12 mmHg    MV peak gradient 4 mmHg    TV rest pulmonary artery pressure 40 mmHg    E/E' ratio 14.73 m/s    MV Peak E Jose Cruz 0.81 m/s    TR Max Jose Cruz 2.81 m/s    MV VTI 22.8 cm    MV stenosis pressure 1/2 time 53.00 ms    MV Peak A Jose Cruz 0.38 m/s    LV Systolic Volume 137.00 mL    LV Systolic Volume Index 68.5 mL/m2    LV Diastolic Volume 179.00 mL    LV Diastolic Volume Index 89.50 mL/m2    LV Mass Index 142 g/m2    Triscuspid Valve Regurgitation Peak Gradient 32 mmHg    RA Width 4.36 cm     [unfilled]  Wt Readings from Last 3 Encounters:   05/20/23 83.5 kg (184 lb 1.4 oz)       Physical Exam:  General: Alert and oriented, no acute distress.  Neck: No carotid bruit, no jugular venous distention.  Respiratory: Breath sounds are equal, symmetrical chest wall expansion. Breath sounds are clear.  Cardiovascular: Normal rate, regular rhythm. No murmur. No gallop. 2+ edema noted.  Integumentary: Clean, warm, dry, and intact.  Neurologic: Alert and oriented.   Psychiatric: Cooperative, appropriate mood and affect.        Assessment/Plan:     Chest discomfort/NSTEMI with known CAD and prior CABG  -initial troponin 1.06 -> AM troponin pending  -recent [facility] on 3-7-2023 with stable anatomy and patent bypass grafts  -echo 3-6-23 with normal LV systolic function  -EKG similar to prior with LBBB  -will likely plan to continue trend troponins, resume home medical therapy when reconciled, and plan for volume removal  -troponin likely  elevated given renal dysfunction and volume overload noted on admission  -Continue Aspirin 81 mg daily     2. Lactic acidosis  -lactate 2.9  5/19/23  -blood cultures no growth at 24hrs  -further plan per internal medicine team     3. ESRD on HD  -nephrology following  -HD yesterday     4. HTN  -resume home medical therapy when home meds reconciled     5. HLD  -statin therapy when home meds available for review     Spoke to family this morning and they would like to consider palliative care at this time.         KASHIF Warner, FNP-C  [facility]

## 2023-05-20 NOTE — PLAN OF CARE
SW received a call from Anjelica with [facility] who reports they are accepting patient and they are ready for discharge. QING was also instructed to have pt's nurse call ALVINA when patient is in route. Following, QING updated pt's nurse via chat and informed her to call [facility] when ready.

## 2023-05-20 NOTE — PLAN OF CARE
Colton received a call from Dr. Oneill regarding patient's request for hospice care at home. COLTON met with patient, his daughter, Martina squires, and wife, Luz Quintana at bedside and discussed hospice preference. Patient's daughter stated she would like [facility] and their first preference and [facility] as their second choice. Patient is currently on 3 liters of oxtgen. Patient's wife reports they have a sleep number bed that adjuses and home oxygen. SW verbalized understanding and  explained to family that they can still receive a hospital bed, oxygen and other DMEs if needed at a later time through their selected hospice agency. Colton obtained signed consent from patient's daughter and sent hospice referral via [employer].        COLTON sent referral to [facility] Phone: [phone] via [employer].

## 2023-05-24 LAB
BACTERIA BLD CULT: NORMAL
BACTERIA BLD CULT: NORMAL

## 2023-05-27 NOTE — DISCHARGE SUMMARY
[facility] Medicine Discharge Summary    Admit Date: 5/18/2023  Discharge Date and Time: 5/20/2023, 02:55 pm  Admitting Physician:  Team  Discharging Physician: Karen Oneill MD.  Primary Care Physician: BRANDON CHILDERS  Consults: Nephrology    Discharge Diagnoses:  ESRD on HD(TTS) with Fluid /volume overload state   NSTEMI type 2 secondary to above   Chronic CHF  Bilateral pleural effusions  Lactic acidosis   Metabolic acidosis  Hyperkalemia  Hyperbilirubinemia and Transaminitis, likely 2/2 hepatic congestion  CAD status post MI/CABG   DM2  Essential hypertension   Normocytic Anemia  History of hyperlipidemia, GERD, diabetic neuropathy, valvular heart disease-aortic stenosis, bilateral carotid stenosis, history of prostate cancer     Hospital Course:     89-year-old male with  medical history of ESRD on HD , CHF with severe systolic HF, EF 15%, mod to severe aortic stenosis, CAD with prior CABG follows Dr. Tinoco with stable coronary anatomy with patent bypass grafts 03/07/2023, anemia presented to ED for evaluation of dry heaving chest discomfort and worsening pedal edema in the past 2 days. The current problem started 2 days ago with intractable dry heaving nausea, weak ness, tiredness,  lethargy and unable to eat and missed his hemodialysis due to feeling unwell and presented to the ED. Denied any fevers chills.  On exam patient quite lethargic but following commands.  His hands and feet are very cold and noted significant pedal edema.  Heart rate controlled rhythm regular lungs diminished breath sounds bilateral bases poor effort.  Abdomen soft nontender .  Labs and imaging reviewed noted troponin elevation initially then trended down, lactic acidosis improving.  BNP 18,927  Imaging showed bilateral pleural effusions with vascular condition multiple other findings.     Nephrology consulted and pt underwent urgent HD with removal of only 500 ml UF and to correct  metabolic derangement and hyperkalemia on the day of admission.   Cardiology consulted given acute on chronic severe systolic CHF, severe aortic stenosis  and elevated troponin. Pt's overall prognosis deemed very poor from cardiac stand point. Nephrology ordered additional dialysis this morning, however pt on his right and sound mind decided not to pursue hemodialysis anymore and expressed his wish to go home and be comfortable. Pt's wife and daughter at bedside supported pt's wish and requested transition care to hospice. CM consulted to setup home hospice. Pt accepted to [facility] and discharge order placed.     Pt was seen and examined on the day of discharge  Vitals:  VITAL SIGNS: 24 HRS MIN & MAX LAST   No data recorded 98.2 °F (36.8 °C)   No data recorded 118/81   No data recorded  81   No data recorded 18   No data recorded 100 %       Physical Exam:    General appearance: Well-developed AA male , ill appearing   HENT: Atraumatic head. EOM- intact  Eyes: Normal extraocular movements.   Neck: Supple.   Lungs: Decreased breath sounds at bases , (+) crackles    Heart: Regular rate and rhythm. S1 and S2 present. BLE pedal edema.Cold distal ext  Abdomen: Soft, non-distended, non-tender.  Extremities: 3+ pitting edema   Skin: No Rash.   Neuro: CN 2-12 intact   Psych/mental status: Appropriate mood and affect. Responds appropriately to questions.       Procedures Performed: No admission procedures for hospital encounter.     Significant Diagnostic Studies: See Full reports for all details    No results for input(s): WBC, RBC, HGB, HCT, MCV, MCH, MCHC, RDW, PLT, MPV, GRAN, LYMPH, MONO, BASO, NRBC in the last 168 hours.    No results for input(s): NA, K, CL, CO2, ANIONGAP, BUN, CREATININE, GLU, CALCIUM, PH, MG, ALBUMIN, PROT, ALKPHOS, ALT, AST, BILITOT in the last 168 hours.     Microbiology Results (last 7 days)       ** No results found for the last 168 hours. **             Echo  · Concentric hypertrophy  and severely decreased systolic function.  · The estimated ejection fraction is 10-15%.  · Grade III left ventricular diastolic dysfunction.  · With moderately to severely reduced right ventricular systolic function.  · There is moderate-to-severe aortic valve stenosis.  · Aortic valve area is 0.78 cm2; peak velocity is 1.74 m/s; mean gradient   is 9 mmHg. Likely low flow moderate to severe AS  · Mild right atrial enlargement.  · Mild tricuspid regurgitation.  · Intermediate central venous pressure (8 mmHg).  · The estimated PA systolic pressure is 40 mmHg.  · There are segmental left ventricular wall motion abnormalities.  · There is mild pulmonary hypertension.     No IV access available for definity use.   CT Chest Abdomen Pelvis With Contrast (xpd)  Narrative: TECHNIQUE:CT SCAN OF THE CHEST ABDOMEN AND PELVIS WAS PERFORMED WITH INTRAVENOUS CONTRAST WITH AXIAL AS WELL AS SAGITTAL AND, CORONAL IMAGES.    DOSAGE INFORMATION:AUTOMATED EXPOSURE CONTROL WAS UTILIZED.  DLP 1320    COMPARISON:NONE.    CLINICAL HISTORY:SEPSIS.    Findings:    Lines and Tubes:Right internal jugular, catheter is present with its tip in the superior vena cave.    Mediastinum:A few subcentimeter mediastinal lymph nodes are seen paratracheal and precarinal and subcarinal.    Heart: Coronary artery calcification is seen. The patient is status post median sternotomy.    Aorta:Unremarkable appearing aorta. Mild aortic calcification is seen in the arch thoracic aorta.    Pulmonary Arteries:No filling defects are seen in the pulmonary arteries to suggest pulmonary embolus to the sensitivity of the study.    Lungs:There are moderate bilateral pleural effusions associated with lower lobe collapse and dependent groundglass densities in posterior segments of upper lobes. Otherwise the lungs are clear with no focal infiltrates or consolidation.    Pleura:There is a large right sided pleural effusion. There is a large left sided pleural  effusion.    Bony Structures:    Spine:Moderate spondylolytic changes are seen in the thoracic spine.    Ribs:No rib fractures are identified.    Abdomen:    Abdominal Wall:There is extensive stranding of the subcutaneous fat suggesting an element of anasarca edema of uncertain etiology. The abdominal wall otherwise appears unremarkable.    Liver: There is right hepatic lobe 9 mm hypodensity on image 56 series 2 which is not adequately characterized and may be a cyst.  This can be further assessed with ultrasound exam.    Biliary System:No intrahepatic or extrahepatic biliary duct dilatation is seen.    Gallbladder:The gallbladder is non-distended and appears otherwise unremarkable.    Pancreas:The pancreas appears unremarkable.    Spleen:The spleen appears unremarkable.    Adrenals:The adrenal glands appear unremarkable.    Kidneys:Multiple cortical hypodensities in bilateral kidneys are noted.  These are not adequately assessed due to limited contrast opacification of the kidneys and may be cysts.  Please further assess with ultrasound exam.  The kidneys are without stones or hydronephrosis.    Aorta:The abdominal aorta appears unremarkable.    Bowel:    Esophagus:The visualized esophagus appears unremarkable.    Stomach:The stomach appears unremarkable.    Duodenum:Unremarkable appearing duodenum.    Small Bowel:The small bowel appears unremarkable.    Colon:Nondistended. There is mild edematous thickening of the cecum and the ascending colon.    Appendix:The appendix is not identified but no inflammatory changes are seen in the right lower quadrant to suggest appendicitis.    Peritoneum:No free intraperitoneal air is seen. Moderate free fluid is seen in the pelvis.    Pelvis:    Bladder:The bladder is nondistended however the bladder wall appears thickened after considering state of nondistension which could reflect an element of cystitis. There is air in the lumen of urinary bladder. Correlate clinically with  regards to recent catheterisation.    Male:    Prostate gland:There are multiple punctate hyperdensities in the prostate gland. These are consistent with brachytherapy beads.    Bony structures:    Dorsal Spine:There is moderate multilevel spondylosis of the visualized dorsal spine.    Bony Pelvis:The visualized bony structures of the pelvis appear unremarkable.  Impression: Impression:    1. No filling defects are seen in the pulmonary arteries to suggest pulmonary embolus to the sensitivity of the study.    2. Moderate free fluid is seen in the pelvis.    3. The bladder is nondistended however the bladder wall appears thickened after considering state of nondistension which could reflect an element of cystitis.    4. There are moderate bilateral pleural effusions associated with lower lobe collapse and dependent groundglass densities in posterior segments of upper lobes. Otherwise the lungs are clear with no focal infiltrates or consolidation.    5. There is mild edematous thickening of the cecum and the ascending colon. Correlate with clinical and laboratory findings with regards to colitis.    6. Details and other findings as discussed above.    No significant discrepancy with overnight report.    Electronically signed by: Maged Amaya  Date:    05/19/2023  Time:    08:54         Medication List        CHANGE how you take these medications      aspirin 81 MG Chew  What changed: Another medication with the same name was removed. Continue taking this medication, and follow the directions you see here.            CONTINUE taking these medications      amLODIPine 5 MG tablet  Commonly known as: NORVASC     clopidogreL 75 mg tablet  Commonly known as: PLAVIX     docusate sodium 100 MG capsule  Commonly known as: COLACE     pantoprazole 40 MG tablet  Commonly known as: PROTONIX     traMADoL 50 mg tablet  Commonly known as: ULTRAM            STOP taking these medications      hydrALAZINE 25 MG tablet  Commonly known  as: APRESOLINE               Explained in detail to the patient about the discharge plan, medications, and follow-up visits. Pt understands and agrees with the treatment plan  Discharge Disposition: Hospice/Home   Discharged Condition: stable  Diet-    Medications Per DC med rec  Activities as tolerated    For further questions contact hospitalist office    Discharge time 33 minutes    For worsening symptoms, chest pain, shortness of breath, increased abdominal pain, high grade fever, stroke or stroke like symptoms, immediately go to the nearest Emergency Room or call 911 as soon as possible.      Karen Morris M.D, on 5/20/2023, 02:55 pm

## 2023-05-30 NOTE — PHYSICIAN QUERY
PT Name: Douglas Quintana  MR #: [id number]     DOCUMENTATION CLARIFICATION     CDS/: Brigitte Hudson RN              Contact information: [email or web address]  This form is a permanent document in the medical record.     Query Date: May 30, 2023    By submitting this query, we are merely seeking further clarification of documentation.  Please utilize your independent clinical judgment when addressing the question(s) below.  The Medical Record contains the following:  Indicators Supporting Clinical Findings Location in Medical Record   x Acute Illness (e.g. AMI, Sepsis, etc.)  Family request palliative  care evaluation to further define goals of care.  Recommend continue abx for multifactorial shock, dialysis as per nephrology, continue asa and start plavix.  Repeat lactic acid this am.  May consider dobutamine infusion if consistent with goals of care.      ESRD on HD(TTS) with Fluid volume overload  NSTEMI type 2 secondary to above  Chronic CHF  Bilateral pleural effusions  Lactic acidosis  Metabolic acidosis  Hyperkalemia-resolved  Hyperbilirubinemia and Transaminitis, likely 2/2 hepatic congestion   PN 5/20       Cardiology          H&P 5/19       HM   x Vital Signs  VITAL SIGNS:24 HRS MIN & MAX  Temp  Min: 97.5 °F (36.4 °C)  Max: 97.5 °F (36.4 °C)  BP  Min: 85/64  Max: 130/96   Pulse  Min: 78  Max: 143  Resp  Min: 10  Max: 29   SpO2  Min: 95 %  Max: 100 %      /81   Pulse 81   Temp 98.2 °F (36.8 °C)   Resp 18  H&P 5/19       HM              PN 5/20       Nephrology   x Acidosis documented Lactic acidosis  Metabolic acidosis H&P 5/19       HM     x ABGs/Labs  05/18/23 21:14 05/19/23 02:50   WBC 10.91 11.07      05/19/23 02:50 05/19/23 13:54   Lactate, Reginald 7.2 (HH) 2.9 (H)         BLOOD CULTURE   No Growth at 5 days        Lab           Lab              Microbiology 5/19           x Hypotension or Low Blood Pressure documented His blood pressures in the range of 90s-120s hold Ace or arbs  afterload already reduced   H&P 5/19       HM   x Altered Mental Status or Confusion  Negative for confusion. ED Provider Note 5/18   x Diaphoresis, Cold Extremities or Cyanosis On exam patient quite lethargic but following commands.  His hands and feet are very cold and  noted significant pedal edema. H&P 5/19       HM    Oliguria     x Medication/Treatment  -Vasopressors  -Inotropic Drugs  -IV Fluids   -IV Antibiotics  -Cardiac Assist Devices  -Hemodynamic Monitoring  -Blood/Blood Products Optimize volume status with dialysis.  May require inotropic support if lactic acid does not improve (2.9 on admission).     dextrose 10% bolus 500 mL 500 mL    piperacillin-tazobactam (ZOSYN) 4.5 g in dextrose 5 % in water (D5W) 5 % 100 mL IVPB every 12 hours    vancomycin (VANCOCIN) 1,750 mg in dextrose 5 % (D5W) 500 mL IVPB     Consult 5/19       Cardiology    MAR 5/18  MAR 5/19, 5/20    MAR 5/19   x Other Patient is empirically initiated on vancomycin and Zosyn for presumed sepsis check MRSA and discontinue vancomycin if needed follow up blood cultures and recommend deescalate antibiotics as the clinical picture is mostly volume overload with heart failure  and ESRD H&P 5/19       HM     Due to the conflicting clinical picture, please clinically validate the Consultants diagnosis of ____multifactorial shock_____________.      If validated, please provide additional clinical support for the diagnosis.     [   ] Shock is not confirmed and/or it has been ruled out     [  x ] Shock is confirmed. Please specify clinical support (signs, symptoms, and treatment) for  the confirmed diagnosis: __Cardiogenic shock, hypotension, lactic acidosis, cold distal ext, peripheral edema __________________  ( Please specify the type on question #2 below)     [   ] Other clarification (please specify): ___________________       2. If Shock is confirmed, please specify the type of Shock diagnosis or diagnoses associated with above clinical  findings.  Select all that apply.    [  x ] Cardiogenic Shock     [    ] Other Shock (please specify): __________     [    ] Other Condition (please specify): _________         Please document in your progress notes daily for the duration of treatment until resolved and include in your discharge summary.     Form No. [id number]